# Patient Record
Sex: MALE | Race: WHITE | NOT HISPANIC OR LATINO | Employment: OTHER | URBAN - METROPOLITAN AREA
[De-identification: names, ages, dates, MRNs, and addresses within clinical notes are randomized per-mention and may not be internally consistent; named-entity substitution may affect disease eponyms.]

---

## 2018-01-16 ENCOUNTER — TELEPHONE (OUTPATIENT)
Dept: SURGERY | Facility: CLINIC | Age: 73
End: 2018-01-16

## 2018-01-16 NOTE — TELEPHONE ENCOUNTER
----- Message from Jero Bonilla sent at 1/15/2018  4:06 PM CST -----  Patient states that he believes he has a sports hernia and would like to speak with a nurse before scheduling an appt//please call back at [phone]//thank you

## 2018-01-30 ENCOUNTER — OFFICE VISIT (OUTPATIENT)
Dept: SURGERY | Facility: CLINIC | Age: 73
End: 2018-01-30
Payer: MEDICARE

## 2018-01-30 VITALS — HEIGHT: 68 IN | TEMPERATURE: 98 F | BODY MASS INDEX: 26.07 KG/M2 | WEIGHT: 172 LBS

## 2018-01-30 DIAGNOSIS — K40.21 BILATERAL RECURRENT INGUINAL HERNIA WITHOUT OBSTRUCTION OR GANGRENE: ICD-10-CM

## 2018-01-30 PROCEDURE — 1159F MED LIST DOCD IN RCRD: CPT | Mod: S$GLB,,, | Performed by: SURGERY

## 2018-01-30 PROCEDURE — 99203 OFFICE O/P NEW LOW 30 MIN: CPT | Mod: S$GLB,,, | Performed by: SURGERY

## 2018-01-30 PROCEDURE — 3008F BODY MASS INDEX DOCD: CPT | Mod: S$GLB,,, | Performed by: SURGERY

## 2018-01-30 PROCEDURE — 99999 PR PBB SHADOW E&M-EST. PATIENT-LVL II: CPT | Mod: PBBFAC,,, | Performed by: SURGERY

## 2018-01-30 RX ORDER — VALSARTAN 80 MG/1
80 TABLET ORAL 2 TIMES DAILY
Status: ON HOLD | COMMUNITY
Start: 2017-11-27 | End: 2018-03-09 | Stop reason: SDUPTHER

## 2018-01-30 RX ORDER — FENOFIBRATE 160 MG/1
160 TABLET ORAL
COMMUNITY
Start: 2018-01-14

## 2018-01-30 NOTE — LETTER
[facility] - General Surgery  [address]  [facility] [address]  Phone: [phone] January 30, 2018      José Miguel Menard MD  [address] [place] [address] [place]  [facility] [address]    Patient: Emiliano Small   MR Number: [id number]   YOB: 1945   Date of Visit: 1/30/2018     Dear Dr. Menard:    Thank you for referring Emiliano Small to me for evaluation. Below are the relevant portions of my assessment and plan of care.    Patient presents with small BIH, symptomatic on the left.     PLAN: He would like to put off surgery.  Return for recheck in one year.    If you have questions, please do not hesitate to call me. I look forward to following Emiliano along with you.    Sincerely,      Juan M Rader MD   Section Head - General, Laparoscopic, Bariatric  Acute Care and Oncologic Surgery   - Surgical Weight Loss Program  [facility]    MIN[facility]/lida

## 2018-01-30 NOTE — PROGRESS NOTES
History & Physical    SUBJECTIVE:     History of Present Illness:  Patient is a 72 y.o. male presents with right groin pain.  4 months ago he threw a javalin and developed pain.  Since then the pain has improved but not resolved.  It is worse in the morning and when he bends over.  The pain is in the rlq and groin and occasionally radiates to the anterior leg.  It is inhibiting his activity.      Chief Complaint   Patient presents with    Hernia     ventral / sports hernia       Review of patient's allergies indicates:  No Known Allergies    Current Outpatient Prescriptions   Medication Sig Dispense Refill    fenofibrate 160 MG Tab       valsartan (DIOVAN) 160 MG tablet Take 160 mg by mouth once daily.      valsartan (DIOVAN) 80 MG tablet        No current facility-administered medications for this visit.        Past Medical History:   Diagnosis Date    Atrial fibrillation     Cancer     melanoma x 2   & basal cell    CKD (chronic kidney disease), stage III     H/O: gout     History of cardioversion 11/2013    no reoccurrence of af    Hypertension      Past Surgical History:   Procedure Laterality Date    ablation procedure for afib      ELBOW BURSA SURGERY Left 1998    achilles tendon    EYE SURGERY Bilateral 1991    kertodomy &  torn retina (left)    FRACTURE SURGERY Left 1984    5th finger    KNEE ARTHROSCOPY Left 2004,  2003    knee arthroscopy Right     TONSILLECTOMY      vascectomy       History reviewed. No pertinent family history.  Social History   Substance Use Topics    Smoking status: Former Smoker     Quit date: 12/12/1984    Smokeless tobacco: Not on file    Alcohol use Yes      Comment: 3-4 weekly        Review of Systems:  Review of Systems   Constitutional: Negative for fever and unexpected weight change.   Respiratory: Positive for shortness of breath. Negative for cough and chest tightness.         Sob possibly due to onset of the flu   Cardiovascular: Negative for chest pain.  "  Gastrointestinal: Negative for constipation, diarrhea, nausea and vomiting.        Recently got over constipation   Genitourinary: Negative for difficulty urinating and dysuria.   Skin: Positive for rash. Negative for wound.        Has low grade eczema    Neurological: Negative for seizures and headaches.   Hematological: Does not bruise/bleed easily.       OBJECTIVE:     Vital Signs (Most Recent)  Temp: 98.3 °F (36.8 °C) (01/30/18 1002)  5' 8" (1.727 m)  78 kg (172 lb)     Physical Exam:  Physical Exam   Constitutional: He is oriented to person, place, and time. He appears well-developed and well-nourished.   Neck: Normal range of motion. Neck supple.   Cardiovascular: Normal rate, regular rhythm and normal heart sounds.    Pulmonary/Chest: Effort normal and breath sounds normal.   Abdominal: Soft. Bowel sounds are normal. There is no tenderness. A hernia is present. Hernia confirmed positive in the right inguinal area and confirmed positive in the left inguinal area.   Genitourinary: Testes normal.   Genitourinary Comments: Small, reducible bilateral inguinal hernias   Neurological: He is alert and oriented to person, place, and time.   Skin: Skin is warm and dry.   Psychiatric: He has a normal mood and affect. His behavior is normal. Judgment and thought content normal.   Vitals reviewed.      Laboratory  None recent    Diagnostic Results:  None recent    ASSESSMENT/PLAN:     Small bih, symptomatic on the left.    PLAN:    He would like to put off surgery.  Return for recheck in one year.              "

## 2018-01-30 NOTE — LETTER
January 30, 2018      José Miguel Menard MD  [address] [place] [address]           [facility] - General Surgery  [address]  [place] [address]  Phone: [phone]          Patient: Emiliano Small   MR Number: [id number]   YOB: 1945   Date of Visit: 1/30/2018       Dear Dr. José Miguel Menard:    Thank you for referring Emiliano Small to me for evaluation. Attached you will find relevant portions of my assessment and plan of care.    If you have questions, please do not hesitate to call me. I look forward to following Emiliano Small along with you.    Sincerely,    Juan M Rader MD    Enclosure  CC:  No Recipients    If you would like to receive this communication electronically, please contact [email or web address] or [phone] to request more information on [employer] access.    For providers and/or their staff who would like to refer a patient to [facility], please contact us through our one-stop-shop provider referral line, [facility], at [phone].    If you feel you have received this communication in error or would no longer like to receive these types of communications, please e-mail [email or web address]

## 2018-02-16 ENCOUNTER — TELEPHONE (OUTPATIENT)
Dept: SURGERY | Facility: CLINIC | Age: 73
End: 2018-02-16

## 2018-02-16 NOTE — TELEPHONE ENCOUNTER
Returned pt's call.  Informed him that Radha will call him Monday morning to select a surgery date.  Pt verbalized understanding.    ----- Message from Jero Bonilla sent at 2/16/2018  2:54 PM CST -----  Patient states that (s)he needs to speak with nurse in ref to scheduling his sx//please call back at [phone]//thank you

## 2018-03-01 ENCOUNTER — TELEPHONE (OUTPATIENT)
Dept: SURGERY | Facility: CLINIC | Age: 73
End: 2018-03-01

## 2018-03-01 NOTE — TELEPHONE ENCOUNTER
----- Message from Judy Loza sent at 3/1/2018  2:28 PM CST -----  Contact: Pt.Self   Pt.States he Would like to speak to nurse in reference to his Appt.Please call Pt. back @ [phone] Thanks :)

## 2018-03-06 ENCOUNTER — LAB VISIT (OUTPATIENT)
Dept: LAB | Facility: HOSPITAL | Age: 73
End: 2018-03-06
Payer: MEDICARE

## 2018-03-06 ENCOUNTER — OFFICE VISIT (OUTPATIENT)
Dept: SURGERY | Facility: CLINIC | Age: 73
End: 2018-03-06
Payer: MEDICARE

## 2018-03-06 VITALS
HEART RATE: 77 BPM | DIASTOLIC BLOOD PRESSURE: 83 MMHG | WEIGHT: 172 LBS | BODY MASS INDEX: 26.07 KG/M2 | TEMPERATURE: 98 F | HEIGHT: 68 IN | SYSTOLIC BLOOD PRESSURE: 163 MMHG

## 2018-03-06 DIAGNOSIS — K40.21 BILATERAL RECURRENT INGUINAL HERNIA WITHOUT OBSTRUCTION OR GANGRENE: ICD-10-CM

## 2018-03-06 DIAGNOSIS — K40.21 BILATERAL RECURRENT INGUINAL HERNIA WITHOUT OBSTRUCTION OR GANGRENE: Primary | ICD-10-CM

## 2018-03-06 LAB
ANION GAP SERPL CALC-SCNC: 7 MMOL/L
BASOPHILS # BLD AUTO: 0.04 K/UL
BASOPHILS NFR BLD: 0.6 %
BUN SERPL-MCNC: 23 MG/DL
CALCIUM SERPL-MCNC: 9.5 MG/DL
CHLORIDE SERPL-SCNC: 107 MMOL/L
CO2 SERPL-SCNC: 27 MMOL/L
CREAT SERPL-MCNC: 1.2 MG/DL
DIFFERENTIAL METHOD: NORMAL
EOSINOPHIL # BLD AUTO: 0.1 K/UL
EOSINOPHIL NFR BLD: 1.9 %
ERYTHROCYTE [DISTWIDTH] IN BLOOD BY AUTOMATED COUNT: 12.6 %
EST. GFR  (AFRICAN AMERICAN): >60 ML/MIN/1.73 M^2
EST. GFR  (NON AFRICAN AMERICAN): >60 ML/MIN/1.73 M^2
GLUCOSE SERPL-MCNC: 88 MG/DL
HCT VFR BLD AUTO: 44.8 %
HGB BLD-MCNC: 14.7 G/DL
IMM GRANULOCYTES # BLD AUTO: 0.02 K/UL
IMM GRANULOCYTES NFR BLD AUTO: 0.3 %
LYMPHOCYTES # BLD AUTO: 1.6 K/UL
LYMPHOCYTES NFR BLD: 25.5 %
MCH RBC QN AUTO: 29.6 PG
MCHC RBC AUTO-ENTMCNC: 32.8 G/DL
MCV RBC AUTO: 90 FL
MONOCYTES # BLD AUTO: 0.6 K/UL
MONOCYTES NFR BLD: 10 %
NEUTROPHILS # BLD AUTO: 3.9 K/UL
NEUTROPHILS NFR BLD: 61.7 %
NRBC BLD-RTO: 0 /100 WBC
PLATELET # BLD AUTO: 248 K/UL
PMV BLD AUTO: 10.9 FL
POTASSIUM SERPL-SCNC: 4.3 MMOL/L
RBC # BLD AUTO: 4.97 M/UL
SODIUM SERPL-SCNC: 141 MMOL/L
WBC # BLD AUTO: 6.32 K/UL

## 2018-03-06 PROCEDURE — 85025 COMPLETE CBC W/AUTO DIFF WBC: CPT

## 2018-03-06 PROCEDURE — 99213 OFFICE O/P EST LOW 20 MIN: CPT | Mod: S$GLB,,, | Performed by: SURGERY

## 2018-03-06 PROCEDURE — 36415 COLL VENOUS BLD VENIPUNCTURE: CPT

## 2018-03-06 PROCEDURE — 99999 PR PBB SHADOW E&M-EST. PATIENT-LVL III: CPT | Mod: PBBFAC,,, | Performed by: SURGERY

## 2018-03-06 PROCEDURE — 80048 BASIC METABOLIC PNL TOTAL CA: CPT

## 2018-03-06 NOTE — LETTER
[facility] - General Surgery  [address]  [facility] [address]  Phone: [phone] March 6, 2018      José Miguel Menard MD  [address] [place] [address]    Patient: Emiliano Small   MR Number: [id number]   YOB: 1945   Date of Visit: 3/6/2018     Dear Dr. Menard:    Thank you for referring Emiliano Small to me for evaluation. Below are the relevant portions of my assessment and plan of care.    ASSESSMENT/PLAN:   Patient is a 72-year-old active male with small bilateral inguinal hernia (symptomatic on right).     - Plan for inguinal hernia repair with mesh  - Risks, benefits, alternatives to the procedure discussed with the patient who wishes to proceed  - All questions and concerns addressed  - Consents obtained today    If you have questions, please do not hesitate to call me. I look forward to following Emiliano along with you.    Sincerely,      Juan M Rader MD   Section Head - General, Laparoscopic, Bariatric  Acute Care and Oncologic Surgery   - Surgical Weight Loss Program  [facility]    [facility]ida

## 2018-03-06 NOTE — PROGRESS NOTES
History & Physical    SUBJECTIVE:     History of Present Illness:  Patient is a 72 y.o. male who presents to clinic today for follow up. He was previously seen in our clinic (1/30/18) for complaint of right groin pain. Pain originally started in Sept 2017 after throwing javelin. Pain improved in the interim but remains lifestyle limiting. He is very active and works out with weight and track and field exercised frequently. He is unable to perform these activities much without provoking pain. He initially wanted to defer surgery but now does would like to proceed so he can return to his accustomed activities.      Chief Complaint   Patient presents with    Follow-up     discuss sx// choose date     Review of patient's allergies indicates:  No Known Allergies    Current Outpatient Prescriptions   Medication Sig Dispense Refill    fenofibrate 160 MG Tab       valsartan (DIOVAN) 160 MG tablet Take 160 mg by mouth once daily.      valsartan (DIOVAN) 80 MG tablet        No current facility-administered medications for this visit.      Past Medical History:   Diagnosis Date    Atrial fibrillation     Cancer     melanoma x 2   & basal cell    CKD (chronic kidney disease), stage III     H/O: gout     History of cardioversion 11/2013    no reoccurrence of af    Hypertension      Past Surgical History:   Procedure Laterality Date    ablation procedure for afib      ELBOW BURSA SURGERY Left 1998    achilles tendon    EYE SURGERY Bilateral 1991    kertodomy &  torn retina (left)    FRACTURE SURGERY Left 1984    5th finger    KNEE ARTHROSCOPY Left 2004,  2003    knee arthroscopy Right     TONSILLECTOMY      vascectomy       No family history on file.  Social History   Substance Use Topics    Smoking status: Former Smoker     Quit date: 12/12/1984    Smokeless tobacco: Not on file    Alcohol use Yes      Comment: 3-4 weekly        Review of Systems:  Review of Systems   Constitutional: Negative for activity  "change, appetite change, diaphoresis, fever and unexpected weight change.   HENT: Negative for congestion, rhinorrhea and sore throat.    Eyes: Negative for visual disturbance.   Respiratory: Negative for cough, chest tightness, shortness of breath and wheezing.    Cardiovascular: Negative for chest pain, palpitations and leg swelling.   Gastrointestinal: Negative for abdominal distention, abdominal pain, constipation, diarrhea, nausea and vomiting.   Genitourinary: Negative for difficulty urinating, dysuria, frequency and hematuria.   Musculoskeletal: Negative for arthralgias and myalgias.   Skin: Positive for rash. Negative for color change and wound.        Has low grade eczema    Neurological: Negative for seizures, syncope, weakness, numbness and headaches.   Hematological: Does not bruise/bleed easily.   Psychiatric/Behavioral: Negative for behavioral problems and confusion.       OBJECTIVE:     Vital Signs (Most Recent)  Temp: 98.1 °F (36.7 °C) (03/06/18 0802)  Pulse: 77 (03/06/18 0802)  BP: (!) 163/83 (03/06/18 0802)  5' 8" (1.727 m)  78 kg (172 lb)     Physical Exam:  Physical Exam   Constitutional: He is oriented to person, place, and time. He appears well-developed and well-nourished. No distress.   HENT:   Head: Normocephalic and atraumatic.   Eyes: EOM are normal.   Neck: Normal range of motion. Neck supple. No JVD present.   Cardiovascular: Normal rate, regular rhythm and intact distal pulses.    Pulmonary/Chest: Effort normal and breath sounds normal. No respiratory distress.   Abdominal: Soft. He exhibits no distension. There is no tenderness. A hernia is present. Hernia confirmed positive in the right inguinal area and confirmed positive in the left inguinal area.   Genitourinary: Testes normal.   Genitourinary Comments: Small, reducible bilateral inguinal hernias   Musculoskeletal: He exhibits no edema or deformity.   Neurological: He is alert and oriented to person, place, and time.   Skin: Skin " is warm and dry. No rash noted. He is not diaphoretic. No erythema.   Psychiatric: He has a normal mood and affect. His behavior is normal. Judgment and thought content normal.   Vitals reviewed.        ASSESSMENT/PLAN:   71 y/o active male with small bilateral inguinal hernia (symptomatic on right)    - Plan for inguinal hernia repair with mesh  - Risks, benefits, alternatives to the procedure discussed with the patient who wishes to proceed  - All questions and concerns addressed  - Consents obtained today      Haseeb Pandya MD  Surgery Resident, PGY-III  Pager: [phone]  3/6/2018 8:26 AM

## 2018-03-06 NOTE — PROGRESS NOTES
I have seen the patient, reviewed the Resident's history and physical, assessment and plan. I have personally interviewed and examined the patient at bedside and: agree with the findings.     For lap cosmo with mesh. Cardiology clearance.

## 2018-03-08 ENCOUNTER — TELEPHONE (OUTPATIENT)
Dept: SURGERY | Facility: CLINIC | Age: 73
End: 2018-03-08

## 2018-03-08 RX ORDER — ASPIRIN 81 MG/1
TABLET ORAL EVERY MORNING
COMMUNITY

## 2018-03-08 RX ORDER — AMOXICILLIN 500 MG
1 CAPSULE ORAL EVERY MORNING
COMMUNITY

## 2018-03-08 RX ORDER — MAGNESIUM 200 MG
1 TABLET ORAL EVERY MORNING
COMMUNITY

## 2018-03-09 ENCOUNTER — SURGERY (OUTPATIENT)
Age: 73
End: 2018-03-09

## 2018-03-09 ENCOUNTER — HOSPITAL ENCOUNTER (OUTPATIENT)
Facility: HOSPITAL | Age: 73
Discharge: HOME | End: 2018-03-09
Attending: SURGERY | Admitting: SURGERY
Payer: MEDICARE

## 2018-03-09 ENCOUNTER — ANESTHESIA EVENT (OUTPATIENT)
Dept: SURGERY | Facility: HOSPITAL | Age: 73
End: 2018-03-09
Payer: MEDICARE

## 2018-03-09 ENCOUNTER — ANESTHESIA (OUTPATIENT)
Dept: SURGERY | Facility: HOSPITAL | Age: 73
End: 2018-03-09
Payer: MEDICARE

## 2018-03-09 VITALS
OXYGEN SATURATION: 100 % | RESPIRATION RATE: 18 BRPM | DIASTOLIC BLOOD PRESSURE: 80 MMHG | TEMPERATURE: 98 F | WEIGHT: 168 LBS | HEIGHT: 69 IN | SYSTOLIC BLOOD PRESSURE: 140 MMHG | HEART RATE: 70 BPM | BODY MASS INDEX: 24.88 KG/M2

## 2018-03-09 DIAGNOSIS — K40.20 BILATERAL INGUINAL HERNIA: ICD-10-CM

## 2018-03-09 PROCEDURE — 71000039 HC RECOVERY, EACH ADD'L HOUR: Performed by: SURGERY

## 2018-03-09 PROCEDURE — 63600175 PHARM REV CODE 636 W HCPCS: Performed by: ANESTHESIOLOGY

## 2018-03-09 PROCEDURE — 36000708 HC OR TIME LEV III 1ST 15 MIN: Performed by: SURGERY

## 2018-03-09 PROCEDURE — 27201423 OPTIME MED/SURG SUP & DEVICES STERILE SUPPLY: Performed by: SURGERY

## 2018-03-09 PROCEDURE — 36000709 HC OR TIME LEV III EA ADD 15 MIN: Performed by: SURGERY

## 2018-03-09 PROCEDURE — 25000003 PHARM REV CODE 250: Performed by: SURGERY

## 2018-03-09 PROCEDURE — D9220A PRA ANESTHESIA: Mod: ANES,,, | Performed by: ANESTHESIOLOGY

## 2018-03-09 PROCEDURE — 37000008 HC ANESTHESIA 1ST 15 MINUTES: Performed by: SURGERY

## 2018-03-09 PROCEDURE — 25000003 PHARM REV CODE 250: Performed by: NURSE ANESTHETIST, CERTIFIED REGISTERED

## 2018-03-09 PROCEDURE — 63600175 PHARM REV CODE 636 W HCPCS: Performed by: SURGERY

## 2018-03-09 PROCEDURE — C1781 MESH (IMPLANTABLE): HCPCS | Performed by: SURGERY

## 2018-03-09 PROCEDURE — 27000221 HC OXYGEN, UP TO 24 HOURS

## 2018-03-09 PROCEDURE — D9220A PRA ANESTHESIA: Mod: CRNA,,, | Performed by: NURSE ANESTHETIST, CERTIFIED REGISTERED

## 2018-03-09 PROCEDURE — 71000033 HC RECOVERY, INTIAL HOUR: Performed by: SURGERY

## 2018-03-09 PROCEDURE — 71000015 HC POSTOP RECOV 1ST HR: Performed by: SURGERY

## 2018-03-09 PROCEDURE — 49650 LAP ING HERNIA REPAIR INIT: CPT | Mod: 50,,, | Performed by: SURGERY

## 2018-03-09 PROCEDURE — 37000009 HC ANESTHESIA EA ADD 15 MINS: Performed by: SURGERY

## 2018-03-09 PROCEDURE — 94761 N-INVAS EAR/PLS OXIMETRY MLT: CPT

## 2018-03-09 PROCEDURE — 63600175 PHARM REV CODE 636 W HCPCS: Performed by: NURSE ANESTHETIST, CERTIFIED REGISTERED

## 2018-03-09 DEVICE — MESH PROLENE 6INX6IN.: Type: IMPLANTABLE DEVICE | Site: INGUINAL | Status: FUNCTIONAL

## 2018-03-09 RX ORDER — DEXAMETHASONE SODIUM PHOSPHATE 4 MG/ML
INJECTION, SOLUTION INTRA-ARTICULAR; INTRALESIONAL; INTRAMUSCULAR; INTRAVENOUS; SOFT TISSUE
Status: DISCONTINUED | OUTPATIENT
Start: 2018-03-09 | End: 2018-03-09

## 2018-03-09 RX ORDER — SODIUM CHLORIDE 0.9 % (FLUSH) 0.9 %
3 SYRINGE (ML) INJECTION
Status: DISCONTINUED | OUTPATIENT
Start: 2018-03-09 | End: 2018-03-09 | Stop reason: HOSPADM

## 2018-03-09 RX ORDER — BUPIVACAINE HYDROCHLORIDE 2.5 MG/ML
INJECTION, SOLUTION EPIDURAL; INFILTRATION; INTRACAUDAL
Status: DISCONTINUED | OUTPATIENT
Start: 2018-03-09 | End: 2018-03-09 | Stop reason: HOSPADM

## 2018-03-09 RX ORDER — CEFAZOLIN SODIUM 1 G/3ML
2 INJECTION, POWDER, FOR SOLUTION INTRAMUSCULAR; INTRAVENOUS
Status: DISCONTINUED | OUTPATIENT
Start: 2018-03-09 | End: 2018-03-09 | Stop reason: HOSPADM

## 2018-03-09 RX ORDER — LIDOCAINE HCL/PF 100 MG/5ML
SYRINGE (ML) INTRAVENOUS
Status: DISCONTINUED | OUTPATIENT
Start: 2018-03-09 | End: 2018-03-09

## 2018-03-09 RX ORDER — POLYETHYLENE GLYCOL 3350 17 G/17G
17 POWDER, FOR SOLUTION ORAL DAILY
Qty: 510 G | Refills: 3 | Status: SHIPPED | OUTPATIENT
Start: 2018-03-09

## 2018-03-09 RX ORDER — ONDANSETRON 8 MG/1
8 TABLET, ORALLY DISINTEGRATING ORAL EVERY 8 HOURS PRN
Qty: 30 TABLET | Refills: 1 | Status: SHIPPED | OUTPATIENT
Start: 2018-03-09

## 2018-03-09 RX ORDER — ACETAMINOPHEN 10 MG/ML
INJECTION, SOLUTION INTRAVENOUS
Status: DISCONTINUED | OUTPATIENT
Start: 2018-03-09 | End: 2018-03-09

## 2018-03-09 RX ORDER — FENTANYL CITRATE 50 UG/ML
INJECTION, SOLUTION INTRAMUSCULAR; INTRAVENOUS
Status: DISCONTINUED | OUTPATIENT
Start: 2018-03-09 | End: 2018-03-09

## 2018-03-09 RX ORDER — FENTANYL CITRATE 50 UG/ML
50 INJECTION, SOLUTION INTRAMUSCULAR; INTRAVENOUS EVERY 5 MIN PRN
Status: DISCONTINUED | OUTPATIENT
Start: 2018-03-09 | End: 2018-03-09 | Stop reason: HOSPADM

## 2018-03-09 RX ORDER — HYDROCODONE BITARTRATE AND ACETAMINOPHEN 10; 325 MG/1; MG/1
1 TABLET ORAL EVERY 4 HOURS PRN
Status: DISCONTINUED | OUTPATIENT
Start: 2018-03-09 | End: 2018-03-09 | Stop reason: HOSPADM

## 2018-03-09 RX ORDER — BACITRACIN 50000 [IU]/1
INJECTION, POWDER, FOR SOLUTION INTRAMUSCULAR
Status: DISCONTINUED | OUTPATIENT
Start: 2018-03-09 | End: 2018-03-09 | Stop reason: HOSPADM

## 2018-03-09 RX ORDER — MIDAZOLAM HYDROCHLORIDE 1 MG/ML
INJECTION, SOLUTION INTRAMUSCULAR; INTRAVENOUS
Status: DISCONTINUED | OUTPATIENT
Start: 2018-03-09 | End: 2018-03-09

## 2018-03-09 RX ORDER — SODIUM CHLORIDE 9 MG/ML
INJECTION, SOLUTION INTRAVENOUS CONTINUOUS
Status: DISCONTINUED | OUTPATIENT
Start: 2018-03-09 | End: 2018-03-09 | Stop reason: HOSPADM

## 2018-03-09 RX ORDER — GLYCOPYRROLATE 0.2 MG/ML
INJECTION INTRAMUSCULAR; INTRAVENOUS
Status: DISCONTINUED | OUTPATIENT
Start: 2018-03-09 | End: 2018-03-09

## 2018-03-09 RX ORDER — ONDANSETRON 2 MG/ML
INJECTION INTRAMUSCULAR; INTRAVENOUS
Status: DISCONTINUED | OUTPATIENT
Start: 2018-03-09 | End: 2018-03-09

## 2018-03-09 RX ORDER — MEPERIDINE HYDROCHLORIDE 50 MG/ML
12.5 INJECTION INTRAMUSCULAR; INTRAVENOUS; SUBCUTANEOUS EVERY 10 MIN PRN
Status: DISCONTINUED | OUTPATIENT
Start: 2018-03-09 | End: 2018-03-09 | Stop reason: HOSPADM

## 2018-03-09 RX ORDER — HYDROCODONE BITARTRATE AND ACETAMINOPHEN 5; 325 MG/1; MG/1
1 TABLET ORAL EVERY 4 HOURS PRN
Qty: 50 TABLET | Refills: 0 | OUTPATIENT
Start: 2018-03-09 | End: 2022-10-11

## 2018-03-09 RX ORDER — PROPOFOL 10 MG/ML
VIAL (ML) INTRAVENOUS
Status: DISCONTINUED | OUTPATIENT
Start: 2018-03-09 | End: 2018-03-09

## 2018-03-09 RX ORDER — ROCURONIUM BROMIDE 10 MG/ML
INJECTION, SOLUTION INTRAVENOUS
Status: DISCONTINUED | OUTPATIENT
Start: 2018-03-09 | End: 2018-03-09

## 2018-03-09 RX ORDER — HYDROCODONE BITARTRATE AND ACETAMINOPHEN 5; 325 MG/1; MG/1
1 TABLET ORAL EVERY 4 HOURS PRN
Status: DISCONTINUED | OUTPATIENT
Start: 2018-03-09 | End: 2018-03-09 | Stop reason: HOSPADM

## 2018-03-09 RX ORDER — ACETAMINOPHEN 325 MG/1
650 TABLET ORAL EVERY 4 HOURS PRN
Status: DISCONTINUED | OUTPATIENT
Start: 2018-03-09 | End: 2018-03-09 | Stop reason: HOSPADM

## 2018-03-09 RX ORDER — VALSARTAN 80 MG/1
80 TABLET ORAL 2 TIMES DAILY
Qty: 14 TABLET | Refills: 0 | Status: SHIPPED | OUTPATIENT
Start: 2018-03-09

## 2018-03-09 RX ADMIN — MIDAZOLAM HYDROCHLORIDE 2 MG: 1 INJECTION, SOLUTION INTRAMUSCULAR; INTRAVENOUS at 01:03

## 2018-03-09 RX ADMIN — CEFAZOLIN 2 G: 330 INJECTION, POWDER, FOR SOLUTION INTRAMUSCULAR; INTRAVENOUS at 02:03

## 2018-03-09 RX ADMIN — PROPOFOL 150 MG: 10 INJECTION, EMULSION INTRAVENOUS at 01:03

## 2018-03-09 RX ADMIN — SODIUM CHLORIDE: 0.9 INJECTION, SOLUTION INTRAVENOUS at 01:03

## 2018-03-09 RX ADMIN — SODIUM CHLORIDE, SODIUM GLUCONATE, SODIUM ACETATE, POTASSIUM CHLORIDE, MAGNESIUM CHLORIDE, SODIUM PHOSPHATE, DIBASIC, AND POTASSIUM PHOSPHATE: .53; .5; .37; .037; .03; .012; .00082 INJECTION, SOLUTION INTRAVENOUS at 02:03

## 2018-03-09 RX ADMIN — SUGAMMADEX 300 MG: 100 INJECTION, SOLUTION INTRAVENOUS at 02:03

## 2018-03-09 RX ADMIN — ROCURONIUM BROMIDE 40 MG: 10 INJECTION, SOLUTION INTRAVENOUS at 01:03

## 2018-03-09 RX ADMIN — ONDANSETRON 4 MG: 2 INJECTION INTRAMUSCULAR; INTRAVENOUS at 01:03

## 2018-03-09 RX ADMIN — DEXAMETHASONE SODIUM PHOSPHATE 4 MG: 4 INJECTION, SOLUTION INTRAMUSCULAR; INTRAVENOUS at 01:03

## 2018-03-09 RX ADMIN — BUPIVACAINE HYDROCHLORIDE 22 ML: 2.5 INJECTION, SOLUTION EPIDURAL; INFILTRATION; INTRACAUDAL; PERINEURAL at 02:03

## 2018-03-09 RX ADMIN — EPHEDRINE SULFATE 10 MG: 50 INJECTION, SOLUTION INTRAMUSCULAR; INTRAVENOUS; SUBCUTANEOUS at 02:03

## 2018-03-09 RX ADMIN — FENTANYL CITRATE 100 MCG: 50 INJECTION, SOLUTION INTRAMUSCULAR; INTRAVENOUS at 01:03

## 2018-03-09 RX ADMIN — ROCURONIUM BROMIDE 10 MG: 10 INJECTION, SOLUTION INTRAVENOUS at 02:03

## 2018-03-09 RX ADMIN — ACETAMINOPHEN 1000 MG: 10 INJECTION, SOLUTION INTRAVENOUS at 02:03

## 2018-03-09 RX ADMIN — HYDROCODONE BITARTRATE AND ACETAMINOPHEN 1 TABLET: 10; 325 TABLET ORAL at 03:03

## 2018-03-09 RX ADMIN — BACITRACIN 50000 UNITS: 50000 INJECTION, POWDER, LYOPHILIZED, FOR SOLUTION INTRAMUSCULAR at 02:03

## 2018-03-09 RX ADMIN — LIDOCAINE HYDROCHLORIDE 75 MG: 20 INJECTION, SOLUTION INTRAVENOUS at 01:03

## 2018-03-09 RX ADMIN — GLYCOPYRROLATE 0.2 MG: 0.2 INJECTION, SOLUTION INTRAMUSCULAR; INTRAVENOUS at 02:03

## 2018-03-09 RX ADMIN — FENTANYL CITRATE 50 MCG: 50 INJECTION, SOLUTION INTRAMUSCULAR; INTRAVENOUS at 03:03

## 2018-03-09 RX ADMIN — FENTANYL CITRATE 50 MCG: 50 INJECTION, SOLUTION INTRAMUSCULAR; INTRAVENOUS at 04:03

## 2018-03-09 NOTE — PLAN OF CARE
PT is AAOx4. VSS. NAD. IV discontinued. Discharge instructions given. Verbalized understanding. Voided without difficulty. Discharged home with family.

## 2018-03-09 NOTE — PRE-PROCEDURE INSTRUCTIONS
Spoke with Patient.  NPO, medication, and pre-op instructions reviewed.  Has a history of post-op urinary retention.  Is currently at a Drug store trying to get a few days of Diovan.  He is currently out of and is waiting for his mail order.  Has a ring that he may not be able to remove.  Reviewed the pain scale.  Aspirin is on Hold.  Verbalized understanding of instructions.

## 2018-03-09 NOTE — BRIEF OP NOTE
[facility]  Brief Operative Note     SUMMARY     Surgery Date: 3/9/2018     Surgeon(s) and Role:     * Juan M Rader MD - Primary     * Isabella Pleitez MD - Resident - Assisting        Pre-op Diagnosis:  Bilateral recurrent inguinal hernia without obstruction or gangrene [K40.21]    Post-op Diagnosis:  Post-Op Diagnosis Codes:     * Bilateral recurrent inguinal hernia without obstruction or gangrene [K40.21]    Procedure(s) (LRB):  REPAIR-HERNIA-INGUINAL-BILATERAL-LAPAROSCOPIC (Bilateral)    Anesthesia: General    Description/Findings of the procedure:  No complications, good hemostasis.    Estimated Blood Loss:  2 mL.       Specimens:   Specimen (12h ago through future)    None          Discharge Note    SUMMARY     Admit Date: 3/9/2018    Discharge Date and Time:  03/09/2018 2:55 PM    Hospital Course (synopsis of major diagnoses, care, treatment, and services provided during the course of the hospital stay): No complications, patient discharged home after routine outpatient surgery.        Final Diagnosis: Post-Op Diagnosis Codes:     * Bilateral recurrent inguinal hernia without obstruction or gangrene [K40.21]    Disposition: Home or Self Care    Follow Up/Patient Instructions:     Medications:  Reconciled Home Medications:   Current Discharge Medication List      START taking these medications    Details   hydrocodone-acetaminophen 5-325mg (NORCO) 5-325 mg per tablet Take 1 tablet by mouth every 4 (four) hours as needed for Pain.  Qty: 50 tablet, Refills: 0      ondansetron (ZOFRAN-ODT) 8 MG TbDL Take 1 tablet (8 mg total) by mouth every 8 (eight) hours as needed (nausea).  Qty: 30 tablet, Refills: 1      polyethylene glycol (GLYCOLAX) 17 gram/dose powder Take 17 g by mouth once daily.  Qty: 510 g, Refills: 3         CONTINUE these medications which have NOT CHANGED    Details   aspirin (ECOTRIN) 81 MG EC tablet Take by mouth every morning. Takes two 81 mg tablets (162 mg) on  Mondays, Wednesdays, and Fridays,  Take one 81 mg tablet the other days of the week.      ERGOCALCIFEROL, VITAMIN D2, (VITAMIN D ORAL) Take 1 tablet by mouth. Takes twice a week      fenofibrate 160 MG Tab Take 160 mg by mouth after dinner.       fish oil-omega-3 fatty acids 300-1,000 mg capsule Take 1 capsule by mouth every morning.      magnesium 200 mg Tab Take 1 tablet by mouth every morning.      UNABLE TO FIND Take 1 tablet by mouth 3 (three) times daily. Take Lipoflavanoid 2-3 times per pain for Tinnitus      valsartan (DIOVAN) 80 MG tablet Take 1 tablet (80 mg total) by mouth 2 (two) times daily.  Qty: 14 tablet, Refills: 0             Discharge Procedure Orders  Diet general     Activity as tolerated     Shower on day dressing removed (No bath)     Ice to affected area     Lifting restrictions     Call MD for:  temperature >100.4     Call MD for:  persistent nausea and vomiting     Call MD for:  severe uncontrolled pain     Call MD for:  difficulty breathing, headache or visual disturbances     Call MD for:  redness, tenderness, or signs of infection (pain, swelling, redness, odor or green/yellow discharge around incision site)     Call MD for:  hives     Call MD for:  persistent dizziness or light-headedness     Remove dressing in 48 hours   Order Comments: Leave steri-strips in place for 2 weeks (unless they fall off sooner).       Follow-up Information     Juan M Rader MD In 2 weeks.    Specialties:  General Surgery, Bariatrics  Why:  Post-op appointment in 2 weeks  Contact information:  [address]  [facility] [address]  [phone]

## 2018-03-09 NOTE — H&P (VIEW-ONLY)
History & Physical    SUBJECTIVE:     History of Present Illness:  Patient is a 72 y.o. male who presents to clinic today for follow up. He was previously seen in our clinic (1/30/18) for complaint of right groin pain. Pain originally started in Sept 2017 after throwing javelin. Pain improved in the interim but remains lifestyle limiting. He is very active and works out with weight and track and field exercised frequently. He is unable to perform these activities much without provoking pain. He initially wanted to defer surgery but now does would like to proceed so he can return to his accustomed activities.      Chief Complaint   Patient presents with    Follow-up     discuss sx// choose date     Review of patient's allergies indicates:  No Known Allergies    Current Outpatient Prescriptions   Medication Sig Dispense Refill    fenofibrate 160 MG Tab       valsartan (DIOVAN) 160 MG tablet Take 160 mg by mouth once daily.      valsartan (DIOVAN) 80 MG tablet        No current facility-administered medications for this visit.      Past Medical History:   Diagnosis Date    Atrial fibrillation     Cancer     melanoma x 2   & basal cell    CKD (chronic kidney disease), stage III     H/O: gout     History of cardioversion 11/2013    no reoccurrence of af    Hypertension      Past Surgical History:   Procedure Laterality Date    ablation procedure for afib      ELBOW BURSA SURGERY Left 1998    achilles tendon    EYE SURGERY Bilateral 1991    kertodomy &  torn retina (left)    FRACTURE SURGERY Left 1984    5th finger    KNEE ARTHROSCOPY Left 2004,  2003    knee arthroscopy Right     TONSILLECTOMY      vascectomy       No family history on file.  Social History   Substance Use Topics    Smoking status: Former Smoker     Quit date: 12/12/1984    Smokeless tobacco: Not on file    Alcohol use Yes      Comment: 3-4 weekly        Review of Systems:  Review of Systems   Constitutional: Negative for activity  "change, appetite change, diaphoresis, fever and unexpected weight change.   HENT: Negative for congestion, rhinorrhea and sore throat.    Eyes: Negative for visual disturbance.   Respiratory: Negative for cough, chest tightness, shortness of breath and wheezing.    Cardiovascular: Negative for chest pain, palpitations and leg swelling.   Gastrointestinal: Negative for abdominal distention, abdominal pain, constipation, diarrhea, nausea and vomiting.   Genitourinary: Negative for difficulty urinating, dysuria, frequency and hematuria.   Musculoskeletal: Negative for arthralgias and myalgias.   Skin: Positive for rash. Negative for color change and wound.        Has low grade eczema    Neurological: Negative for seizures, syncope, weakness, numbness and headaches.   Hematological: Does not bruise/bleed easily.   Psychiatric/Behavioral: Negative for behavioral problems and confusion.       OBJECTIVE:     Vital Signs (Most Recent)  Temp: 98.1 °F (36.7 °C) (03/06/18 0802)  Pulse: 77 (03/06/18 0802)  BP: (!) 163/83 (03/06/18 0802)  5' 8" (1.727 m)  78 kg (172 lb)     Physical Exam:  Physical Exam   Constitutional: He is oriented to person, place, and time. He appears well-developed and well-nourished. No distress.   HENT:   Head: Normocephalic and atraumatic.   Eyes: EOM are normal.   Neck: Normal range of motion. Neck supple. No JVD present.   Cardiovascular: Normal rate, regular rhythm and intact distal pulses.    Pulmonary/Chest: Effort normal and breath sounds normal. No respiratory distress.   Abdominal: Soft. He exhibits no distension. There is no tenderness. A hernia is present. Hernia confirmed positive in the right inguinal area and confirmed positive in the left inguinal area.   Genitourinary: Testes normal.   Genitourinary Comments: Small, reducible bilateral inguinal hernias   Musculoskeletal: He exhibits no edema or deformity.   Neurological: He is alert and oriented to person, place, and time.   Skin: Skin " is warm and dry. No rash noted. He is not diaphoretic. No erythema.   Psychiatric: He has a normal mood and affect. His behavior is normal. Judgment and thought content normal.   Vitals reviewed.        ASSESSMENT/PLAN:   73 y/o active male with small bilateral inguinal hernia (symptomatic on right)    - Plan for inguinal hernia repair with mesh  - Risks, benefits, alternatives to the procedure discussed with the patient who wishes to proceed  - All questions and concerns addressed  - Consents obtained today      Haseeb Pandya MD  Surgery Resident, PGY-III  Pager: [phone]  3/6/2018 8:26 AM

## 2018-03-09 NOTE — OP NOTE
DATE OF PROCEDURE:  3/9/2018.    PRE OP DIAGNOSIS:  Bilateral inguinal hernias.    POST OP DIAGNOSIS:  Bilateral inguinal hernias.    PROCEDURE:   Laparoscopic bilateral inguinal hernia repair with mesh.     ATTENDING:  Juan M Rader MD.    ASSISTANT:  Isabella Pleitez MD.    ANESTHESIA:  General.     ESTIMATED BLOOD LOSS:  2 mL.    IV FLUIDS:  1500 mL crystalloid.    SPECIMENS:  None.    COMPLICATIONS:  None.    OPERATIVE PROCEDURE:   Following appropriate informed consent, the patient was taken to the operative room where general anesthesia was induced without complication.  The patient received 2 grams of IV cefazolin for preoperative antibiotic prophylaxis.  The patient was prepped and draped in the standard sterile fashion.  Following a WHO-appropriate time-out, an infraumbilical incision was made with 11-blade for access to the preperitoneal space bluntly dissecting down to the rectus sheath which was sharply incised.  Additional blunt dissection of rectus muscle performed for exposure of the posterior sheath with subsequent placement of the balloon dilator from this point directly toward the pubic symphysis, dilating balloon under direct vision to less than 30 puffs. The balloon was removed from the trocar and pneumopreperitoneum to 8 mm of CO2 gas obtained. Two additional 5 mm trocars were placed in the lower midline under direct vision.  Pubic symphysis and ramus were dissected free on both sides. This was taken to the anterior superior iliac spine on both sides. A small vessel on the left lateral abdominal wall was bipolared with excellent hemostasis. There were bilateral indirect hernias.  These were brought deeply and freely into the preperitoneal space. Two 4 x 6 inch keyhole meshes were fashioned out of Prolene and soaked in antibiotic solution, followed by placement in the preperitoneal space and unfolded around the cord.  Each mesh was tacked to close the keyhole on the pubic tubercle and  ramus along their most anterior borders and to the midline to each other. Left overlay patch was also placed. The preperitoneum was inspected and hemostasis was excellent.  Trocars were removed under direct vision. Prior to removing the last trocar, pneumopreperitoneum was allowed to escape and 20 mL of Marcaine solution instilled via the port. The fascia at the naval was closed with a 0-Vicryl.  Skin incisions were closed with 4-0 plain catgut, and reinforced with Mastisol, Steri-Strips and Band-Aids. The patient tolerated the procedure very well and was brought to Recovery Room in stable condition.  Sponge and needle counts were correct at the end of the case.      Dr. Rader was present and scrubbed for the entire case and there were no complications.

## 2018-03-09 NOTE — OP NOTE
DATE OF PROCEDURE: 3/9/2018    PRE OP DIAGNOSIS: Bilateral recurrent inguinal hernia without obstruction or gangrene [K40.21]    POST OP DIAGNOSIS: Bilateral recurrent inguinal hernia without obstruction or gangrene [K40.21]    PROCEDURE: Procedure(s) (LRB):  REPAIR-HERNIA-INGUINAL-BILATERAL-LAPAROSCOPIC (Bilateral)    Surgeon(s) and Role:     * Juan M Rader MD - Primary     * Isabella Pleitez MD - Resident - Assisting  ANESTHESIA: General.     PROCEDURE IN DETAIL: The patient was placed under general anesthesia. Peña   was placed. The abdomen was prepped and draped in the usual manner. Access to   the preperitoneal space was gained by making an incision in the inferior   umbilicus, bluntly dissecting down to the rectus sheath, sharply incising   this and bluntly dissecting the rectus muscle to the posterior sheath and   placing a balloon dilator from this point to the pubic symphysis and dilating   under direct vision to less than 30 puffs. The balloon was removed, the trocar was left   in its place and pneumopreperitoneum to 8 mm of CO2 gas was obtained. Two 5 mm   trocars were placed in the lower midline under direct vision. Pubic symphysis   and ramus were dissected free on both sides. This was taken to the anterior   superior iliac spine on both sides. There were no direct hernias, bilateral lipomas of cord, left indirect hernias and no femoral hernias.  These were brought deeply and freely into the preperitoneal space. 2 4 x 6 inch keyhole meshes were fashioned out of Prolene, placed in antibiotic solution, placed in the preperitoneal space and unfolded around the cord. They were tacked to close the keyhole on the pubic tubercle and ramus   along their most anterior borders and to the midline to each other. right overlay patch was placed. The preperitoneum was inspected for hemostasis. Trocars were removed under direct vision. Prior to removing the last trocar, pneumopreperitoneum was allowed to  escape and 20 mL of Marcaine solution instilled. The fascia at the naval was closed with 0-Vicryl.  Skin incisions were closed with 4-0 plain catgut, and reinforced with Mastisol, Steri-Strips and Band-Aids. The patient tolerated the procedure well and was brought to Recovery Room in stable condition. Sponge and needle counts were correct at the end of the case.    Blood loss: min Pathology: none Complications: none    Juan M Rader MD

## 2018-03-09 NOTE — TRANSFER OF CARE
"Anesthesia Transfer of Care Note    Patient: Emiliano Small    Procedure(s) Performed: Procedure(s) (LRB):  REPAIR-HERNIA-INGUINAL-BILATERAL-LAPAROSCOPIC (Bilateral)    Patient location: PACU    Anesthesia Type: general    Transport from OR: Transported from OR on 6-10 L/min O2 by face mask with adequate spontaneous ventilation    Post pain: adequate analgesia    Post assessment: tolerated procedure well and no apparent anesthetic complications    Post vital signs: stable    Level of consciousness: awake, alert and oriented    Nausea/Vomiting: no nausea/vomiting    Complications: none    Transfer of care protocol was followed      Last vitals:   Visit Vitals  /73 (BP Location: Right arm, Patient Position: Lying)   Pulse 82   Temp 36.2 °C (97.2 °F) (Temporal)   Resp 18   Ht 5' 8.5" (1.74 m)   Wt 76.2 kg (168 lb)   SpO2 100%   BMI 25.17 kg/m²     "

## 2018-03-09 NOTE — DISCHARGE INSTRUCTIONS
Hernia (Adult)    A hernia can happen when there is a weakness or defect in the wall of the abdomen or groin. Intestines or nearby tissues may move from their usual location and push through the weakness in the wall. This can cause a hernia (bulge) you may see or feel.  Causes and Risk Factors   A hernia may be present at birth. Or it may be caused by the wear and tear of daily living. Certain factors can make a hernia more likely. These can include:  · Heavy lifting  · Straining, whether from lifting, movement, or constipation  · Chronic cough  · Injury to the abdominal wall  · Excess weight  · Pregnancy  · Prior surgery  · Older age  · Family history of hernia  Symptoms  Symptoms of a hernia may come on suddenly. Or they may appear slowly over time. Some common symptoms include:  · Bulge in the groin area, around the navel, or in the scrotum (the bulge may get bigger when you stand and go away when you lie down)  · Pain or pressure around the bulge  · Pain during activities such as lifting, coughing, or sneezing  · A feeling of weakness or pressure in the groin  · Pain or swelling in the scrotum  Types of hernias  There are different types of hernia. The type you have depends on its location:  · Inguinal: This type is in the groin or scrotum. It is more common in men.  · Femoral: This type is in the groin, upper thigh (where the leg bends), or labia. It is more common in women.  · Ventral: This type is in the abdominal wall.  · Umbilical: This type occurs around the navel (belly button).  · Incisional: This type occurs at the site of a previous surgery.  The condition of the hernia can help determine how urgently it needs to be treated.  · Reducible: It goes back in by itself, or it can be pushed back in.  · Irreducible: It cant be pushed back in.  · Incarcerated/Strangulated: The intestine is trapped (incarcerated). If this happens, you wont be able to push the bulge back in. If the incarcerated hernia isnt  treated, it may become strangulated. This means the area loses blood supply and the tissue may die. This requires emergency surgery! Treatment is needed right away!  In most cases, a hernia will not heal on its own. Surgery is usually needed to repair the defect in the abdominal wall or groin. Youll be told more about surgery, if needed.  If your symptoms are not severe, treatment may sometimes be delayed. In such cases, regular follow-up visits with the provider will be needed. Youll be asked to keep track of your symptoms and to watch for signs of more serious problems. You may also be given guidelines similar to the home care instructions below.  Home Care  To help keep a hernia from getting worse, you may be advised to:  · Avoid heavy lifting and straining as directed.  · Take steps to prevent constipation, such as eating more fiber and drinking more water. This may help reduce straining that can occur when having a bowel movement. Reducing straining may help keep your symptoms from getting worse.  · Maintain a healthy weight or lose excess weight. This can help reduce strain on abdominal muscles and tissues.  · Stop smoking. This can help prevent coughing that may also strain abdominal muscles and tissues.  Follow-up care  Follow up with your healthcare provider, or as directed. If imaging tests were done, they will be reviewed a doctor. You will be told the results and any new findings that may affect your care.  When to seek medical advice  Call your healthcare provider right away if any of these occur:  · Hernia hardens, swells, or grows larger  · Hernia can no longer be pushed back in  · Pain moves to the lower right abdomen (just below the waistline), or spreads to the back  Call 911  Call 911 right away if any of these occur:  · Nausea and vomiting  · Severe pain, redness, or tenderness in the area near the hernia  · Pain worsens quickly and doesnt get better  · Inability to have a bowel movement or  pass gas  · Fever of 100.4°F (38°C) or higher  · Trouble breathing  · Fainting  · Rapid heart rate  · Vomiting blood  · Large amounts of blood in stool

## 2018-03-09 NOTE — BRIEF OP NOTE
Operative Note       Surgery Date: 3/9/2018     Surgeon(s) and Role:     * Juan M Rader MD - Primary     * Isabella Pleitez MD - Resident - Assisting    Pre-op Diagnosis:  Bilateral recurrent inguinal hernia without obstruction or gangrene [K40.21]    Post-op Diagnosis:  Bilateral recurrent inguinal hernia without obstruction or gangrene [K40.21]    Procedure(s) (LRB):  REPAIR-HERNIA-INGUINAL-BILATERAL-LAPAROSCOPIC (Bilateral)    Anesthesia: General    Procedure in Detail/Findings:  Bilateral lipomas and left indirect hernia.    Estimated Blood Loss: Minimal           Specimens     None        Implants:   Implant Name Type Inv. Item Serial No.  Lot No. LRB No. Used   MESH PROLENE [id number]. - [id number]  MESH PROLENE [id number].  [employer] [id number] Left 1   MESH PROLENE [id number]. - [id number]   MESH PROLENE [id number].   [employer] [id number] Right 1              Disposition: PACU - hemodynamically stable.           Condition: Good    Attestation:  I was present and scrubbed for the entire procedure.

## 2018-03-09 NOTE — PLAN OF CARE
Pt AAOx4. States pain is currently tolerable; PRN pain medication administered as ordered. Sites remain CDI with ice pack in place. Tolerating sips of water. VSS. Safety maintained. Pt to be discharged home per [facility].

## 2018-03-09 NOTE — ANESTHESIA PREPROCEDURE EVALUATION
03/09/2018  Emiliano Small is a 72 y.o., male.    Anesthesia Evaluation    I have reviewed the Patient Summary Reports.     I have reviewed the Medications.     Review of Systems  Anesthesia Hx:  No problems with previous Anesthesia  History of prior surgery of interest to airway management or planning: Previous anesthesia: General   Social:  Former Smoker, Social Alcohol Use    Hematology/Oncology:  Hematology Normal   Oncology Normal     EENT/Dental:EENT/Dental Normal   Cardiovascular:   Exercise tolerance: good Hypertension, well controlled    Pulmonary:  Pulmonary Normal    Renal/:   Chronic Renal Disease    Musculoskeletal:   Arthritis     Neurological:  Neurology Normal    Endocrine:  Endocrine Normal    Dermatological:  Skin Normal    Psych:  Psychiatric Normal           Physical Exam  General:  Well nourished    Airway/Jaw/Neck:  Airway Findings: Mouth Opening: Normal Tongue: Normal  General Airway Assessment: Adult  Mallampati: II  TM Distance: Normal, at least 6 cm  Jaw/Neck Findings:  Neck ROM: Normal ROM      Dental:  Dental Findings: In tact        Mental Status:  Mental Status Findings:  Cooperative         Anesthesia Plan  Type of Anesthesia, risks & benefits discussed:  Anesthesia Type:  general  Patient's Preference: GA  Intra-op Monitoring Plan: standard ASA monitors  Intra-op Monitoring Plan Comments:   Post Op Pain Control Plan: multimodal analgesia  Post Op Pain Control Plan Comments:   Induction:   IV  Beta Blocker:  Patient is not currently on a Beta-Blocker (No further documentation required).       Informed Consent: Patient understands risks and agrees with Anesthesia plan.  Questions answered. Anesthesia consent signed with patient.  ASA Score: 3     Day of Surgery Review of History & Physical:  There are no significant changes.  H&P update referred to the surgeon.          Ready For Surgery From Anesthesia Perspective.

## 2018-03-15 NOTE — ANESTHESIA POSTPROCEDURE EVALUATION
"Anesthesia Post Evaluation    Patient: Emiliano Small    Procedure(s) Performed: Procedure(s) (LRB):  REPAIR-HERNIA-INGUINAL-BILATERAL-LAPAROSCOPIC (Bilateral)    Final Anesthesia Type: general  Patient location during evaluation: PACU  Patient participation: Yes- Able to Participate  Level of consciousness: awake and alert and oriented  Post-procedure vital signs: reviewed and stable  Pain management: adequate  Airway patency: patent  PONV status at discharge: No PONV  Anesthetic complications: no      Cardiovascular status: stable  Respiratory status: unassisted, spontaneous ventilation and room air  Hydration status: euvolemic  Follow-up not needed.  Comments: Called on phone, [phone]        Visit Vitals  BP (!) 140/80   Pulse 70   Temp 36.6 °C (97.9 °F) (Temporal)   Resp 18   Ht 5' 8.5" (1.74 m)   Wt 76.2 kg (168 lb)   SpO2 100%   BMI 25.17 kg/m²       Pain/Reinaldo Score: No Data Recorded      "

## 2018-03-18 NOTE — PHYSICIAN QUERY
PT Name: Emiliano Small  MR #: [id number]     Physician Query Form - Documentation Clarification      CDS/: Beth Warren               Contact information: [email or web address]    This form is a permanent document in the medical record.     Query Date: March 18, 2018    By submitting this query, we are merely seeking further clarification of documentation. Please utilize your independent clinical judgment when addressing the question(s) below.    The Medical record reflects the following:    Supporting Clinical Findings Location in Medical Record   Bilateral lipomas and left indirect hernia.       Brief Op Note   There were no direct hernias, bilateral lipomas of cord, left indirect hernias and no femoral hernias.    There were bilateral indirect hernias.   Operative Note                                                                            Dear Doctor, In the clinical findings above, it was both documented that a left indirect hernia and bilateral indirect hernias were found and repaired in the Operative Notes within the chart.  Can you further specify:    Provider Use Only      ____ Left Indirect Hernia only  ___xx_ Bilateral Indirect Hernias  ____ Undetermined  ____ Other, please specify ______________________________________    And     ____ Laparoscopic Bilateral Hernia Recurrent Repair  ____ Left Laparoscopic Hernia Recurrent Repair only  ____ Other, please specify ______________________________________                                                                                                                 [  ] Clinically undetermined

## 2018-03-22 ENCOUNTER — OFFICE VISIT (OUTPATIENT)
Dept: SURGERY | Facility: CLINIC | Age: 73
End: 2018-03-22
Payer: MEDICARE

## 2018-03-22 VITALS
SYSTOLIC BLOOD PRESSURE: 162 MMHG | TEMPERATURE: 98 F | WEIGHT: 172.19 LBS | DIASTOLIC BLOOD PRESSURE: 77 MMHG | HEART RATE: 78 BPM | HEIGHT: 69 IN | BODY MASS INDEX: 25.5 KG/M2

## 2018-03-22 DIAGNOSIS — Z09 POSTOP CHECK: Primary | ICD-10-CM

## 2018-03-22 PROBLEM — K40.21 BILATERAL RECURRENT INGUINAL HERNIA WITHOUT OBSTRUCTION OR GANGRENE: Status: RESOLVED | Noted: 2018-01-30 | Resolved: 2018-03-22

## 2018-03-22 PROBLEM — K40.20 BILATERAL INGUINAL HERNIA: Status: RESOLVED | Noted: 2018-03-09 | Resolved: 2018-03-22

## 2018-03-22 PROCEDURE — 99024 POSTOP FOLLOW-UP VISIT: CPT | Mod: S$GLB,,, | Performed by: SURGERY

## 2018-03-22 PROCEDURE — 99999 PR PBB SHADOW E&M-EST. PATIENT-LVL III: CPT | Mod: PBBFAC,,, | Performed by: SURGERY

## 2018-03-22 NOTE — LETTER
[facility] - General Surgery  [address]  [facility] [address]  Phone: [phone] March 22, 2018      José Miguel Menard MD  [address] [place] [id number]    Patient: Emiliano Small   MR Number: [id number]   YOB: 1945   Date of Visit: 3/22/2018     Dear Dr. Menard:    Thank you for referring Emiliano Small to me for evaluation. Below are the relevant portions of my assessment and plan of care.    ASSESSMENT/PLAN:   Patient is a 72-year-old active male with small bilateral inguinal hernia (symptomatic on right) status post laparoscopic bilateral inguinal hernia repair with mesh.    PLAN:   - Doing well post-operatively with no apparent complications  - Continue lifting precautions  - Continue local wound care  - Follow up as needed    If you have questions, please do not hesitate to call me. I look forward to following Emiliano along with you.    Sincerely,      Juan M Rader MD   Section Head - General, Laparoscopic, Bariatric  Acute Care and Oncologic Surgery   - Surgical Weight Loss Program  [facility]    [facility]

## 2018-03-22 NOTE — PROGRESS NOTES
I have seen the patient, reviewed the Resident's history and physical, assessment and plan. I have personally interviewed and examined the patient at bedside and: agree with the findings.     S/p lap bih with mesh 3/9/18.  He only has some discomfort.  Wounds clear, no hernias but there is some resolving swelling in the right groin.  Continue light duty until April 20 and rtc prn.

## 2018-03-22 NOTE — PROGRESS NOTES
History & Physical    SUBJECTIVE:     Chief Complaint: Post-operative follow up    History of Present Illness:  Patient is a 72 y.o. male with Hx bilateral inguina hernia s/p laparoscopic bilateral inguinal hernia repair with mesh (3/9/18) who presents to clinic today for follow up. He has done well since surgery. He reports some discomfort that persists but says he would not describe it as pain at this point. Did have some bruising of bilateral scrotum that he states has significantly improved. No fever. No issues with incision. No redness or drainage.    Review of patient's allergies indicates:  No Known Allergies    Current Outpatient Prescriptions   Medication Sig Dispense Refill    aspirin (ECOTRIN) 81 MG EC tablet Take by mouth every morning. Takes two 81 mg tablets (162 mg) on Mondays, Wednesdays, and Fridays,  Take one 81 mg tablet the other days of the week.      ERGOCALCIFEROL, VITAMIN D2, (VITAMIN D ORAL) Take 1 tablet by mouth. Takes twice a week      fenofibrate 160 MG Tab Take 160 mg by mouth after dinner.       fish oil-omega-3 fatty acids 300-1,000 mg capsule Take 1 capsule by mouth every morning.      hydrocodone-acetaminophen 5-325mg (NORCO) 5-325 mg per tablet Take 1 tablet by mouth every 4 (four) hours as needed for Pain. 50 tablet 0    magnesium 200 mg Tab Take 1 tablet by mouth every morning.      ondansetron (ZOFRAN-ODT) 8 MG TbDL Take 1 tablet (8 mg total) by mouth every 8 (eight) hours as needed (nausea). 30 tablet 1    polyethylene glycol (GLYCOLAX) 17 gram/dose powder Take 17 g by mouth once daily. 510 g 3    UNABLE TO FIND Take 1 tablet by mouth 3 (three) times daily. Take Lipoflavanoid 2-3 times per pain for Tinnitus      valsartan (DIOVAN) 80 MG tablet Take 1 tablet (80 mg total) by mouth 2 (two) times daily. 14 tablet 0     No current facility-administered medications for this visit.      Past Medical History:   Diagnosis Date    Arthritis     OA    Atrial fibrillation      Cancer     melanoma x 2   & basal cell    CKD (chronic kidney disease), stage III     H/O: gout     History of cardioversion 11/2013    no reoccurrence of af    Hypertension     Inguinal hernia recurrent bilateral     Tinnitus      Past Surgical History:   Procedure Laterality Date    ablation procedure for afib      ELBOW BURSA SURGERY Left 1998    achilles tendon    EYE SURGERY Bilateral 1991    kertodomy &  torn retina (left)    FRACTURE SURGERY Left 1984    5th finger    INGUINAL HERNIA REPAIR Bilateral 03/09/2018    Laparoscopic    KNEE ARTHROSCOPY Left 2004,  2003    knee arthroscopy Right     TONSILLECTOMY      vascectomy       No family history on file.  Social History   Substance Use Topics    Smoking status: Former Smoker     Types: Cigars     Quit date: 1982    Smokeless tobacco: Never Used    Alcohol use 1.8 - 2.4 oz/week     3 - 4 Cans of beer per week        Review of Systems:  Review of Systems   Constitutional: Negative for activity change, appetite change, diaphoresis, fever and unexpected weight change.   HENT: Negative for congestion, rhinorrhea and sore throat.    Eyes: Negative for visual disturbance.   Respiratory: Negative for cough, chest tightness, shortness of breath and wheezing.    Cardiovascular: Negative for chest pain, palpitations and leg swelling.   Gastrointestinal: Negative for abdominal distention, abdominal pain, constipation, diarrhea, nausea and vomiting.   Genitourinary: Negative for difficulty urinating, dysuria, frequency and hematuria.   Musculoskeletal: Negative for arthralgias and myalgias.   Skin: Positive for rash. Negative for color change and wound.        Has low grade eczema    Neurological: Negative for seizures, syncope, weakness, numbness and headaches.   Hematological: Does not bruise/bleed easily.   Psychiatric/Behavioral: Negative for behavioral problems and confusion.       OBJECTIVE:     Vital Signs (Most Recent)  Temp: 97.8 °F (36.6 °C)  "(03/22/18 0834)  Pulse: 78 (03/22/18 0834)  BP: (!) 162/77 (03/22/18 0834)  5' 8.5" (1.74 m)  78.1 kg (172 lb 2.9 oz)     Physical Exam:  Physical Exam   Constitutional: He is oriented to person, place, and time. He appears well-developed and well-nourished. No distress.   HENT:   Head: Normocephalic and atraumatic.   Eyes: EOM are normal.   Neck: Normal range of motion. Neck supple. No JVD present.   Cardiovascular: Normal rate, regular rhythm and intact distal pulses.    Pulmonary/Chest: Effort normal and breath sounds normal. No respiratory distress.   Abdominal:   Soft, ND, NT, incisions well approximated and healing well with no signs of infection x 3   Genitourinary: Testes normal.   Musculoskeletal: He exhibits no edema or deformity.   Neurological: He is alert and oriented to person, place, and time.   Skin: Skin is warm and dry. No rash noted. He is not diaphoretic. No erythema.   Psychiatric: He has a normal mood and affect. His behavior is normal. Judgment and thought content normal.   Vitals reviewed.        ASSESSMENT/PLAN:   71 y/o active male with small bilateral inguinal hernia (symptomatic on right) s/p laparoscopic bilateral inguinal hernia repair with mesh    - Doing well post-operatively with no apparent complications  - Continue lifting precautions  - Continue local wound care  - Follow up as needed      Haseeb Pandya MD  Surgery Resident, PGY-III  Pager: [phone]  3/22/2018 9:16 AM  "

## 2019-11-14 ENCOUNTER — OFFICE VISIT (OUTPATIENT)
Dept: OTOLARYNGOLOGY | Facility: CLINIC | Age: 74
End: 2019-11-14
Payer: MEDICARE

## 2019-11-14 VITALS
HEIGHT: 68 IN | SYSTOLIC BLOOD PRESSURE: 150 MMHG | DIASTOLIC BLOOD PRESSURE: 80 MMHG | BODY MASS INDEX: 25.94 KG/M2 | WEIGHT: 171.19 LBS

## 2019-11-14 DIAGNOSIS — R13.10 DYSPHAGIA, UNSPECIFIED TYPE: Primary | ICD-10-CM

## 2019-11-14 DIAGNOSIS — R09.82 POST-NASAL DRAINAGE: ICD-10-CM

## 2019-11-14 DIAGNOSIS — R09.81 NASAL CONGESTION: ICD-10-CM

## 2019-11-14 PROCEDURE — 1125F PR PAIN SEVERITY QUANTIFIED, PAIN PRESENT: ICD-10-PCS | Mod: S$GLB,,, | Performed by: OTOLARYNGOLOGY

## 2019-11-14 PROCEDURE — 99204 PR OFFICE/OUTPT VISIT, NEW, LEVL IV, 45-59 MIN: ICD-10-PCS | Mod: 25,S$GLB,, | Performed by: OTOLARYNGOLOGY

## 2019-11-14 PROCEDURE — 1101F PT FALLS ASSESS-DOCD LE1/YR: CPT | Mod: CPTII,S$GLB,, | Performed by: OTOLARYNGOLOGY

## 2019-11-14 PROCEDURE — 1101F PR PT FALLS ASSESS DOC 0-1 FALLS W/OUT INJ PAST YR: ICD-10-PCS | Mod: CPTII,S$GLB,, | Performed by: OTOLARYNGOLOGY

## 2019-11-14 PROCEDURE — 1159F MED LIST DOCD IN RCRD: CPT | Mod: S$GLB,,, | Performed by: OTOLARYNGOLOGY

## 2019-11-14 PROCEDURE — 31575 PR LARYNGOSCOPY, FLEXIBLE; DIAGNOSTIC: ICD-10-PCS | Mod: S$GLB,,, | Performed by: OTOLARYNGOLOGY

## 2019-11-14 PROCEDURE — 99204 OFFICE O/P NEW MOD 45 MIN: CPT | Mod: 25,S$GLB,, | Performed by: OTOLARYNGOLOGY

## 2019-11-14 PROCEDURE — 1159F PR MEDICATION LIST DOCUMENTED IN MEDICAL RECORD: ICD-10-PCS | Mod: S$GLB,,, | Performed by: OTOLARYNGOLOGY

## 2019-11-14 PROCEDURE — 31575 DIAGNOSTIC LARYNGOSCOPY: CPT | Mod: S$GLB,,, | Performed by: OTOLARYNGOLOGY

## 2019-11-14 PROCEDURE — 1125F AMNT PAIN NOTED PAIN PRSNT: CPT | Mod: S$GLB,,, | Performed by: OTOLARYNGOLOGY

## 2019-11-19 NOTE — PROGRESS NOTES
Chief Complaint   Patient presents with    Other     Trouble Swallowing         74 y.o. male presents with occasional feeling of something getting stuck in his throat when he swallows. It first happened a few weeks ago, then got better, but a few days ago he noted again feeling of some foods feeling stuck. He was seen in the ED when his symptoms first occurred and he was worried that it might be tetanus from a recent cut. He was treated for pharyngitis with Amoxicillin. He does note some increased nasal congestion and post nasal drainage. No fevers or facial pain.      Review of Systems     Constitutional: Negative for fatigue and unexpected weight change.   HENT: per HPI.  Eyes: Negative for visual disturbance.   Respiratory: Negative for shortness of breath, hemoptysis  Cardiovascular: Negative for chest pain and palpitations.   Genitourinary: Negative for dysuria and difficulty urinating.   Musculoskeletal: Negative for decreased ROM, back pain.   Skin: Negative for rash, sunburn, itching.   Neurological: Negative for dizziness and seizures.   Hematological: Negative for adenopathy. Does not bruise/bleed easily.   Psychiatric/Behavioral: Negative for agitation. The patient is not nervous/anxious.   Endocrine: Negative for rapid weight loss/weight gain, heat/cold intolerance.     Past Medical History:   Diagnosis Date    Arthritis     OA    Atrial fibrillation     Cancer     melanoma x 2   & basal cell    CKD (chronic kidney disease), stage III     H/O: gout     History of cardioversion 11/2013    no reoccurrence of af    Hypertension     Inguinal hernia recurrent bilateral     Tinnitus        Past Surgical History:   Procedure Laterality Date    ablation procedure for afib      ELBOW BURSA SURGERY Left 1998    achilles tendon    EYE SURGERY Bilateral 1991    kertodomy &  torn retina (left)    FRACTURE SURGERY Left 1984    5th finger    INGUINAL HERNIA REPAIR Bilateral 03/09/2018    Laparoscopic     KNEE ARTHROSCOPY Left 2004,  2003    knee arthroscopy Right     TONSILLECTOMY      vascectomy         family history is not on file.    Pt  reports that he quit smoking about 37 years ago. His smoking use included cigars. He has never used smokeless tobacco. He reports that he drinks about 3.0 - 4.0 standard drinks of alcohol per week.    Review of patient's allergies indicates:  No Known Allergies     Physical Exam    Vitals:    11/14/19 0832   BP: (!) 150/80     Body mass index is 26.03 kg/m².    Physical Exam   Constitutional: He is oriented to person, place, and time. He appears well-developed and well-nourished. No distress.   HENT:   Head: Normocephalic and atraumatic.   Right Ear: Hearing, tympanic membrane, external ear and ear canal normal. Tympanic membrane mobility is normal. No middle ear effusion. No decreased hearing is noted.   Left Ear: Hearing, tympanic membrane, external ear and ear canal normal. Tympanic membrane mobility is normal.  No middle ear effusion. No decreased hearing is noted.   Nose: Nose normal.   Mouth/Throat: Oropharynx is clear and moist.   Bilateral nasal cavities inspected, no polyps or purulent fluid seen.  Dry nasal mucosa bilaterally   Eyes: Pupils are equal, round, and reactive to light. Conjunctivae, EOM and lids are normal. Right eye exhibits no discharge. Left eye exhibits no discharge.   Neck: Trachea normal, normal range of motion and phonation normal. Neck supple. No tracheal tenderness present. No tracheal deviation, no edema and no erythema present. No thyroid mass and no thyromegaly present.   Cardiovascular: Normal heart sounds.   Pulmonary/Chest: Breath sounds normal. No stridor.   Abdominal: Soft.   Lymphadenopathy:     He has no cervical adenopathy.   Neurological: He is alert and oriented to person, place, and time.   Skin: Skin is warm and dry. No rash noted. He is not diaphoretic. No erythema. No pallor.   Psychiatric: He has a normal mood and affect.    Nursing note and vitals reviewed.    Procedure: Flexible laryngoscopy  In order to fully examine the upper aerodigestive tract, including the larynx, in a patient with a hyperactive gag reflex, flexible endoscopy is required.  After explaining the procedure and obtaining verbal consent, a timeout was performed with the patient's participation according to the universal protocol. Both nasal cavities were anesthetized with 4% Xylocaine spray mixed with Emery-Synephrine. The flexible laryngoscope was inserted into the nasal cavity and advanced to visualize the nasal cavity, nasopharynx, the posterior oropharynx, hypopharynx, and the endolarynx with the above findings noted. The scope was removed and the procedure terminated. The patient tolerated this procedure well without apparent complication.     Nasopharynx - the torus is clear. There are no lesions of the posterior wall.   Oropharynx - no lesions of the tongue base. There is no obvious fullness or asymmetry.  Hypopharynx - there are no lesions of the pyriform sinuses or postcricoid region    Larynx - there are no lesions of the supraglottic or glottic larynx. Vocal fold mobility is normal with complete closure.     Assessment     1. Dysphagia, unspecified type    2. Nasal congestion    3. Post-nasal drainage          Plan  In summary, Mr. Smlal is a 74 year old male with dry nasal mucosa, nasal congestion, PND and dysphagia. No abnormality on flexible laryngoscopy today, reassurance given. Recommend consistent use of nasal saline for improved mucociliary clearance. All questions were answered. If symptoms fail to improve, return to clinic, may need MBSS for further evaluation.

## 2020-06-02 ENCOUNTER — CLINICAL SUPPORT (OUTPATIENT)
Dept: REHABILITATION | Facility: HOSPITAL | Age: 75
End: 2020-06-02
Payer: MEDICARE

## 2020-06-02 DIAGNOSIS — M25.511 CHRONIC PAIN OF BOTH SHOULDERS: ICD-10-CM

## 2020-06-02 DIAGNOSIS — R29.898 WEAKNESS OF SHOULDER: ICD-10-CM

## 2020-06-02 DIAGNOSIS — G89.29 CHRONIC PAIN OF BOTH SHOULDERS: ICD-10-CM

## 2020-06-02 DIAGNOSIS — R29.3 POSTURE IMBALANCE: ICD-10-CM

## 2020-06-02 DIAGNOSIS — M54.2 CERVICAL PAIN (NECK): ICD-10-CM

## 2020-06-02 DIAGNOSIS — M25.512 CHRONIC PAIN OF BOTH SHOULDERS: ICD-10-CM

## 2020-06-02 PROCEDURE — 97161 PT EVAL LOW COMPLEX 20 MIN: CPT | Mod: PN

## 2020-06-02 PROCEDURE — 97110 THERAPEUTIC EXERCISES: CPT | Mod: PN

## 2020-06-02 NOTE — PROGRESS NOTES
[facility]  Physical Therapy Initial Evaluation    Name: Emiliano Small  [facility] Number: [id number]    Therapy Diagnosis:   Encounter Diagnoses   Name Primary?    Cervical pain (neck)     Chronic pain of both shoulders     Weakness of shoulder     Posture imbalance      Physician: Haja Ivey MD    Physician Orders: PT Eval and Treat   Medical Diagnosis from Referral:   M75.22 (ICD-10-CM) - Bicipital tendinitis of left shoulder   M75.120 (ICD-10-CM) - Complete tear of rotator cuff   M25.611 (ICD-10-CM) - Decreased range of motion of right shoulder       Evaluation Date: 6/2/2020  Authorization Period Expiration: 6/1/2021  Plan of Care Expiration: 9/2/2020  Visit # / Visits authorized: 1/ 1    Time In: 2:30 pm  Time Out: 3:15 pm  Total Billable Time: 45  minutes    Precautions: A- fib and L TKA (5 years ago)    Subjective   Date of onset: Several years ago  History of current condition - Emiliano reports: that he is in therapy for both shoulder and neck. Pt states pain has worsen for the past year. ANASTACIO: he was throwing hammer (olympic weight). Bench press and lifting weights cause shoulder pain. Pt has pain in the B anterior, posterior and lateral shoulder and pain in the cervical region. Pt describe pain as aching pain. Pt states sometimes pain travels down towards arms. Pt complain of numbness of L hand at times. Numbness is localized in the L forearm and fingers. Pt states he had vertigo about 1 weeks ago.Pt states neck and shoulder pain wakes him up at night.  Aggravating factors: holding the phone on his hears, throwing hammer, lifting  weights, pull ups. Easing factors: NSAID's and ice. Pt had injection on right shoulder 9 years ago.      Medical History:   Past Medical History:   Diagnosis Date    Arthritis     OA    Atrial fibrillation     Cancer     melanoma x 2   & basal cell    CKD (chronic kidney disease), stage III     H/O: gout     History of  cardioversion 11/2013    no reoccurrence of af    Hypertension     Inguinal hernia recurrent bilateral     Tinnitus        Surgical History:   Emiliano Small  has a past surgical history that includes Tonsillectomy; vascectomy; Elbow bursa surgery (Left, 1998); Knee arthroscopy (Left, 2004,  2003); knee arthroscopy (Right); Eye surgery (Bilateral, 1991); Fracture surgery (Left, 1984); ablation procedure for afib; and Inguinal hernia repair (Bilateral, 03/09/2018).    Medications:   Emiliano has a current medication list which includes the following prescription(s): aspirin, ergocalciferol (vitamin d2), fenofibrate, fish oil-omega-3 fatty acids, hydrocodone-acetaminophen, magnesium, ondansetron, polyethylene glycol, UNABLE TO FIND, and valsartan.    Allergies:   Review of patient's allergies indicates:  No Known Allergies     Imaging, None     Prior Therapy: yes. 9 years ago  Social History:  lives with their spouse  Occupation: Retired   Prior Level of Function: Independent   Current Level of Function: ADL's and IADL's     Pain:   Current 2/10, worst 6/10, best 0/10  B Shoulders   Current 3/10, worst 6/10, best 0/10 cervical   Location: bilateral shoulder and cervical   Description: See above   Aggravating Factors: see above   Easing Factors: see above     Pts goals: Return to exercises without any pain     Objective       Observation:     Posture Alignment: slouched posture;forward head;rounded shoulders    Shoulder rotation at rest: protracted     Sensation: Light touch: intact to light touch    DTR: intact to UE    GAIT DEVIATIONS: Emiliano displays No major deviation     Cervical Range of Motion:    Degrees Pain   Flexion WFL No      Extension 26 deg  No     Right Rotation Mod loss  Yes     Left Rotation Mod loss Yes      Right Side Bending 25 deg Yes    Left Side Bending 40 deg  Yes      Cervical Special Tests:  Compression: negative  Spurling's:  Positive L side       Pas  Active Range of Motion in   (degrees):   Shoulder Right Left   Flexion 174 deg with pain 172 with pain   Abduction 172 deg with pain 171 with pain    ER T3 T3    IR  T11 T11     Upper Extremity Strength  (R) UE  (L) UE    Shoulder flexion: 4/5 Shoulder flexion: 4/5   Shoulder Abduction: 4/5 Shoulder abduction: 4/5   Shoulder ER 4/5 Shoulder ER 4/5   Shoulder IR 4-/5 Shoulder IR 4/5   Lower Trap 4-/5 Lower Trap 4-/5   Middle Trap 4-/5 Middle Trap 4-/5   Rhomboids 4-/5 Rhomboids 4-/5       Special Tests:    Impingement   Right Left   Neer's  positive  positive   Marx-Lex  positive  positive     Rotator Cuff:     Right Left   Drop Arm Test  negative  negative   Subscapularis Lift Off Test  negative  negative   ER Lag Sign  negative  negative   IR Lag Sign  negative  negative   Empty Can Test  positive positive   Full Can Test positive positive   Scapular Retraction Test positive positive     Labrum:     Right Left   Sharkey's Test negative negative   Clunk Test negative negative       AC Joint/Biceps:     Right Left   AC Joint Compression Test negative negative   Speed's test positive positive       Palpation: Increase B rhomboid, infraspinatus, mai minor, teres major, LHOB pain during palpation. Increase B raghavendra-scapular tightness.   Scapula: protracted     Flexibility: decrease upper traps flexibility     Scapular Control/Dyskinesis:  None   SPDI:       CMS Impairment/Limitation/Restriction for FOTO Shoulder Survey  Status Limitation G-Code CMS Severity Modifier  Intake 76% 24% Current Status CJ - At least 20 percent but less than 40 percent  Predicted 73% 27% Goal Status+ CJ - At least 20 percent but less than 40 percent  D/C Status CJ **only report if this is a one time visit    TREATMENT   Treatment Time In: 3:00 pm  Treatment Time Out: 3:10 pm   Total Treatment time separate from Evaluation: 10 minutes    Emiliano received therapeutic exercises to develop strength, endurance, ROM, flexibility and posture for 10 minutes including:  Pec  stretch doorways   Upper traps stretch   Standing scapular retraction     Home Exercises and Patient Education Provided    Education provided:   - Perform HEP 2 times per week    Written Home Exercises Provided: Patient instructed to cont prior HEP.  Exercises were reviewed and Emiliano was able to demonstrate them prior to the end of the session.  Emiliano demonstrated good  understanding of the education provided.     See EMR under Patient Instructions for exercises provided 6/2/2020.    Assessment   Emiliano is a 74 y.o. male referred to outpatient Physical Therapy with a medical diagnosis of Bicipital tendinitis of left shoulder, Complete tear of rotator cuff, Decreased range of motion of right shoulder. Pt presents to therapy with B shoulder pain and cervical pain. Pt is having pain during recreational activities and gym exercises. Pt demonstrated poor sitting and standing posture indicating upper cross syndrome. Slouched posture and rounded shoulders were visible during observation. Decrease cervical range of motion with pain in the upper traps due to over activation of trapezius muscles. Pt was positive for Spurling test, but PT thinks it was falsi positive since pt demonstrated delay in response of pain. Pt has WFL B shoulder, but he has pain during motions. Pt was positive for impingement tests today. Pt main problem is posture syndrome causing shoulders and cervical issues. Pt will benefit from skilled PT services to decrease functional limitations.     Pt prognosis is Good.   Pt will benefit from skilled outpatient Physical Therapy to address the deficits stated above and in the chart below, provide pt/family education, and to maximize pt's level of independence.     Plan of care discussed with patient: Yes  Pt's spiritual, cultural and educational needs considered and patient is agreeable to the plan of care and goals as stated below:     Anticipated Barriers for therapy: None     Medical Necessity is  demonstrated by the following  History  Co-morbidities and personal factors that may impact the plan of care Co-morbidities:   Arthritis   OA   Atrial fibrillation   Cancer   melanoma x 2   & basal cell   CKD (chronic kidney disease), stage III   H/O: gout   History of cardioversion   no reoccurrence of af   Hypertension   Inguinal hernia recurrent bilateral   Tinnitus       Personal Factors:   age     low   Examination  Body Structures and Functions, activity limitations and participation restrictions that may impact the plan of care Body Regions:   B shoulder and cervical     Body Systems:    ROM  strength    Participation Restrictions:   Community activities      Activity limitations:   Learning and applying knowledge  no deficits    General Tasks and Commands  no deficits    Communication  no deficits    Mobility  lifting and carrying objects  Throwing     Self care  no deficits    Domestic Life  no deficits    Interactions/Relationships  no deficits    Life Areas  no deficits    Community and Social Life  community life  recreation and leisure         Complexity: low  See FOTO outcome assessment    Clinical Presentation stable and uncomplicated low   Decision Making/ Complexity Score: low     GOALS: Short Term Goals: 4 weeks  1.Report decreased in pain at worse less than  <   / =  5  /10  to increase tolerance for functional mobility. On going  2. Pt to improve Cervical and shoulder range of motion by 10% to allow for improved functional mobility to allow for improvement in IADLs. On going  3. Increased B shoulder  MMT 1/2 grade to increase tolerance for ADL and work activities. On going  4. Pt to report be conscious of impaired sitting and standing posture daily to decrease pain. On going  5. Pt to tolerate HEP to improve ROM and independence with ADL's. On going    Long Term Goals: 8 weeks  1.Report decreased in pain at worse less than  <   / =  2 /10  to increase tolerance for functional mobility  2.   Patient will demonstrate improved overall function per FOTO Survey to CJ = at least 20% but < 40% impaired, limited or restricted score or less.  3.Increased B Shoulders  MMT 1 grade to increase tolerance for ADL and work activities.  4. Pt to report and demonstrate proper posture in standing and sitting to decrease pain  5. Pt to be Independent with HEP to improve ROM and independence with ADL's.  6. Pt to improve Cervical range of motion by 75% to allow for improved functional mobility to allow for improvement in IADLs.     Plan   Plan of care Certification: 6/2/2020 to 9/2/2020    Outpatient Physical Therapy 2 times weekly for 12 weeks to include the following interventions: Manual Therapy, Moist Heat/ Ice, Neuromuscular Re-ed, Patient Education, Therapeutic Activites and Therapeutic Exercise.     Jose F Carlisle, PT

## 2020-06-10 ENCOUNTER — CLINICAL SUPPORT (OUTPATIENT)
Dept: REHABILITATION | Facility: HOSPITAL | Age: 75
End: 2020-06-10
Payer: MEDICARE

## 2020-06-10 DIAGNOSIS — M25.511 CHRONIC PAIN OF BOTH SHOULDERS: ICD-10-CM

## 2020-06-10 DIAGNOSIS — R29.3 POSTURE IMBALANCE: ICD-10-CM

## 2020-06-10 DIAGNOSIS — M54.2 CERVICAL PAIN (NECK): ICD-10-CM

## 2020-06-10 DIAGNOSIS — R29.898 WEAKNESS OF SHOULDER: ICD-10-CM

## 2020-06-10 DIAGNOSIS — M25.512 CHRONIC PAIN OF BOTH SHOULDERS: ICD-10-CM

## 2020-06-10 DIAGNOSIS — G89.29 CHRONIC PAIN OF BOTH SHOULDERS: ICD-10-CM

## 2020-06-10 PROCEDURE — 97110 THERAPEUTIC EXERCISES: CPT | Mod: PN,CQ

## 2020-06-10 NOTE — PROGRESS NOTES
Physical Therapy Daily Treatment Note     Name: Emiliano Rodas [facility] Number: [id number]    Therapy Diagnosis:   Encounter Diagnoses   Name Primary?    Cervical pain (neck)     Chronic pain of both shoulders     Weakness of shoulder     Posture imbalance      Physician: Haja Ivey MD    Visit Date: 6/10/2020    Physician Orders: PT Eval and Treat   Medical Diagnosis from Referral:   M75.22 (ICD-10-CM) - Bicipital tendinitis of left shoulder   M75.120 (ICD-10-CM) - Complete tear of rotator cuff   M25.611 (ICD-10-CM) - Decreased range of motion of right shoulder         Evaluation Date: 6/2/2020  Authorization Period Expiration: 6/1/2021  Plan of Care Expiration: 9/2/2020  Visit # / Visits authorized: 2/ 1     Time In: 0830  Time Out: 0845  Total Billable Time: 45  minutes     Precautions: A- fib and L TKA (5 years ago)      Subjective     Pt reports: feeling a little twinge in my right shoulder, pain is worse when I wake up in the morning .  He was somewhat compliant with home exercise program.  Response to previous treatment: fair  Functional change: ongoing    Pain: 2/10  Location: R shoulder      Objective     Emiliano received therapeutic exercises to develop strength, endurance, ROM, flexibility and posture for 10 minutes including:    UBE 3/3 ( some pain going back in posterior shoulder)  Pec stretch doorways 3x30 seconds  Upper traps stretch [id number]  Chin tucks x10 with 5 seconds  Standing scapular retraction   ER with wand x15  Flexion with wand x15  Rows green tubing 3x10  B straight arm pulldown RTB 3x10  ER Yellow tubing 2x10 B  IR Green tubing 2x10 B    Emiliano received the following manual therapy techniques:  were applied to the:  for  minutes, including:      Emiliano participated in neuromuscular re-education activities to improve:  for  minutes. The following activities were included:        Emiliano received the following direct contact modalities after being cleared for  contraindications:     Emiliano received the following supervised modalities after being cleared for contradictions:     Emiliano received hot pack for  minutes to .    Emiliano received cold pack for  minutes to .      Home Exercises Provided and Patient Education Provided     Education provided:   - Cont HEP    Written Home Exercises Provided: yes.  Exercises were reviewed and Emiliano was able to demonstrate them prior to the end of the session.  Emiliano demonstrated good  understanding of the education provided.     See EMR under Patient Instructions for exercises provided 6/10/2020.    Assessment     Pt tolerated session well today. Pt presents with slight forward head posture and rounded shoulders. Pt experienced some pain today in posterior shoulder/scapular region, did not increase past a 3/10. Pt ROM is more limited in R shoulder compared to L. No adverse affects to prescribed exercises today.  Emiliano is progressing well towards his goals.   Pt prognosis is Good.     Pt will continue to benefit from skilled outpatient physical therapy to address the deficits listed in the problem list box on initial evaluation, provide pt/family education and to maximize pt's level of independence in the home and community environment.     Pt's spiritual, cultural and educational needs considered and pt agreeable to plan of care and goals.     Anticipated barriers to physical therapy: none    GOALS: Short Term Goals: 4 weeks  1.Report decreased in pain at worse less than  <   / =  5  /10  to increase tolerance for functional mobility. On going  2. Pt to improve Cervical and shoulder range of motion by 10% to allow for improved functional mobility to allow for improvement in IADLs. On going  3. Increased B shoulder  MMT 1/2 grade to increase tolerance for ADL and work activities. On going  4. Pt to report be conscious of impaired sitting and standing posture daily to decrease pain. On going  5. Pt to tolerate HEP to improve  ROM and independence with ADL's. On going     Long Term Goals: 8 weeks  1.Report decreased in pain at worse less than  <   / =  2 /10  to increase tolerance for functional mobility  2.  Patient will demonstrate improved overall function per FOTO Survey to CJ = at least 20% but < 40% impaired, limited or restricted score or less.  3.Increased B Shoulders  MMT 1 grade to increase tolerance for ADL and work activities.  4. Pt to report and demonstrate proper posture in standing and sitting to decrease pain  5. Pt to be Independent with HEP to improve ROM and independence with ADL's.  6. Pt to improve Cervical range of motion by 75% to allow for improved functional mobility to allow for improvement in IADLs.       Plan     Outpatient Physical Therapy 2 times weekly for 12 weeks to include the following interventions: Manual Therapy, Moist Heat/ Ice, Neuromuscular Re-ed, Patient Education, Therapeutic Activites and Therapeutic Exercise.        Omari Olivares, PTA

## 2020-06-15 NOTE — PROGRESS NOTES
Physical Therapy Daily Treatment Note     Name: Emiliano Rodas [facility] Number: [id number]    Therapy Diagnosis:   Encounter Diagnoses   Name Primary?    Cervical pain (neck)     Chronic pain of both shoulders     Weakness of shoulder     Posture imbalance      Physician: Haja Ivey MD    Visit Date: 6/16/2020    Physician Orders: PT Eval and Treat   Medical Diagnosis from Referral:   M75.22 (ICD-10-CM) - Bicipital tendinitis of left shoulder   M75.120 (ICD-10-CM) - Complete tear of rotator cuff   M25.611 (ICD-10-CM) - Decreased range of motion of right shoulder         Evaluation Date: 6/2/2020  Authorization Period Expiration: 6/1/2021  Plan of Care Expiration: 9/2/2020  Visit # / Visits authorized: 2/ 1     Time In: 10:30 am  Time Out: 11:15 am  Total Billable Time: 45  minutes     Precautions: A- fib and L TKA (5 years ago)      Subjective     Pt reports: he cont wake up in the morning with cervical pain. Pt reports Shoulder ER with band caused lateral arm pain in both UE.  He was somewhat compliant with home exercise program.  Response to previous treatment: fair  Functional change: ongoing    Pain: 2/10  Location: R shoulder      Objective     Emiliano received therapeutic exercises to develop strength, endurance, ROM, flexibility and posture for 40 minutes including:    UBE 3/3   Pec stretch doorways 3x30 seconds  Upper traps stretch [id number]  Seated trunk extension w/ towel 1 x10   ER with (5#) wand  x15  Flexion with (5#) wand 20  Rows green TB 2x10 hold 2 sec  B straight arm pulldown GTB 2x10 hold 2 sec  ER Yellow tubing 2x10 B  IR Green tubing 2x10 B  Prone shoulder I's 2x10 2#  Wall clock YTB 1x10      Emiliano received the following manual therapy techniques:  were applied to the: B scapular mobs for 5 minutes, including:   Scapular mobs in all direction       Emiliano received hot pack for 10   minutes to B scapular .        Home Exercises Provided and Patient Education Provided      Education provided:   - Cont HEP    Written Home Exercises Provided: yes.  Exercises were reviewed and Emiliano was able to demonstrate them prior to the end of the session.  Emiliano demonstrated good  understanding of the education provided.     See EMR under Patient Instructions for exercises provided 6/10/2020.    Assessment     Pt tolerated session well today. Pt presented with provocation of B lateral shoulder pain during therapeutic exercises. V/c's were required to perform exercise with pain free range of motion. Pt presents with slight forward head posture and rounded shoulders. 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 Pt was able to tolerated progression of periscapular muscle strength to improve posture. Addition of wall clock, prone I's and seated trunk extension with minor provocation of lateral shoulder pain. He cont with dysfunction of scapulothoracic. Scapular dyskinesia was noted during wall clock and shoulder ER. During manual therapy, several tissue dysfunction and muscle tightness of rhomboids muscles and other periscapular muscles. Pt might benefit from dry needling at periscapular muscles.     Emiliano is progressing well towards his goals.   Pt prognosis is Good.     Pt will continue to benefit from skilled outpatient physical therapy to address the deficits listed in the problem list box on initial evaluation, provide pt/family education and to maximize pt's level of independence in the home and community environment.     Pt's spiritual, cultural and educational needs considered and pt agreeable to plan of care and goals.     Anticipated barriers to physical therapy: none    GOALS: Short Term Goals: 4 weeks  1.Report decreased in pain at worse less than  <   / =  5  /10  to increase tolerance for functional mobility. On going  2. Pt to improve Cervical and shoulder range of motion by 10% to allow for improved functional mobility to allow for improvement in  IADLs. On going  3. Increased B shoulder  MMT 1/2 grade to increase tolerance for ADL and work activities. On going  4. Pt to report be conscious of impaired sitting and standing posture daily to decrease pain. On going  5. Pt to tolerate HEP to improve ROM and independence with ADL's. On going     Long Term Goals: 8 weeks  1.Report decreased in pain at worse less than  <   / =  2 /10  to increase tolerance for functional mobility  2.  Patient will demonstrate improved overall function per FOTO Survey to CJ = at least 20% but < 40% impaired, limited or restricted score or less.  3.Increased B Shoulders  MMT 1 grade to increase tolerance for ADL and work activities.  4. Pt to report and demonstrate proper posture in standing and sitting to decrease pain  5. Pt to be Independent with HEP to improve ROM and independence with ADL's.  6. Pt to improve Cervical range of motion by 75% to allow for improved functional mobility to allow for improvement in IADLs.       Plan     Outpatient Physical Therapy 2 times weekly for 12 weeks to include the following interventions: Manual Therapy, Moist Heat/ Ice, Neuromuscular Re-ed, Patient Education, Therapeutic Activites and Therapeutic Exercise.        Jose F Carlisle, PT

## 2020-06-16 ENCOUNTER — CLINICAL SUPPORT (OUTPATIENT)
Dept: REHABILITATION | Facility: HOSPITAL | Age: 75
End: 2020-06-16
Payer: MEDICARE

## 2020-06-16 DIAGNOSIS — M25.511 CHRONIC PAIN OF BOTH SHOULDERS: ICD-10-CM

## 2020-06-16 DIAGNOSIS — G89.29 CHRONIC PAIN OF BOTH SHOULDERS: ICD-10-CM

## 2020-06-16 DIAGNOSIS — M25.512 CHRONIC PAIN OF BOTH SHOULDERS: ICD-10-CM

## 2020-06-16 DIAGNOSIS — R29.3 POSTURE IMBALANCE: ICD-10-CM

## 2020-06-16 DIAGNOSIS — M54.2 CERVICAL PAIN (NECK): ICD-10-CM

## 2020-06-16 DIAGNOSIS — R29.898 WEAKNESS OF SHOULDER: ICD-10-CM

## 2020-06-16 PROCEDURE — 97110 THERAPEUTIC EXERCISES: CPT | Mod: PN

## 2020-06-18 ENCOUNTER — CLINICAL SUPPORT (OUTPATIENT)
Dept: REHABILITATION | Facility: HOSPITAL | Age: 75
End: 2020-06-18
Payer: MEDICARE

## 2020-06-18 DIAGNOSIS — M25.511 CHRONIC PAIN OF BOTH SHOULDERS: ICD-10-CM

## 2020-06-18 DIAGNOSIS — M25.512 CHRONIC PAIN OF BOTH SHOULDERS: ICD-10-CM

## 2020-06-18 DIAGNOSIS — M54.2 CERVICAL PAIN (NECK): ICD-10-CM

## 2020-06-18 DIAGNOSIS — R29.3 POSTURE IMBALANCE: ICD-10-CM

## 2020-06-18 DIAGNOSIS — G89.29 CHRONIC PAIN OF BOTH SHOULDERS: ICD-10-CM

## 2020-06-18 DIAGNOSIS — R29.898 WEAKNESS OF SHOULDER: ICD-10-CM

## 2020-06-18 PROCEDURE — 97110 THERAPEUTIC EXERCISES: CPT | Mod: PN

## 2020-06-18 PROCEDURE — 97140 MANUAL THERAPY 1/> REGIONS: CPT | Mod: PN

## 2020-06-22 ENCOUNTER — CLINICAL SUPPORT (OUTPATIENT)
Dept: REHABILITATION | Facility: HOSPITAL | Age: 75
End: 2020-06-22
Payer: MEDICARE

## 2020-06-22 DIAGNOSIS — R29.898 WEAKNESS OF SHOULDER: ICD-10-CM

## 2020-06-22 DIAGNOSIS — G89.29 CHRONIC PAIN OF BOTH SHOULDERS: ICD-10-CM

## 2020-06-22 DIAGNOSIS — M25.511 CHRONIC PAIN OF BOTH SHOULDERS: ICD-10-CM

## 2020-06-22 DIAGNOSIS — R29.3 POSTURE IMBALANCE: ICD-10-CM

## 2020-06-22 DIAGNOSIS — M25.512 CHRONIC PAIN OF BOTH SHOULDERS: ICD-10-CM

## 2020-06-22 DIAGNOSIS — M54.2 CERVICAL PAIN (NECK): ICD-10-CM

## 2020-06-22 PROCEDURE — 97110 THERAPEUTIC EXERCISES: CPT | Mod: PN

## 2020-06-22 PROCEDURE — 97140 MANUAL THERAPY 1/> REGIONS: CPT | Mod: PN

## 2020-06-22 NOTE — PROGRESS NOTES
Physical Therapy Daily Treatment Note     Name: Emiliano Rodas [facility] Number: [id number]    Therapy Diagnosis:   Encounter Diagnoses   Name Primary?    Cervical pain (neck)     Chronic pain of both shoulders     Weakness of shoulder     Posture imbalance      Physician: Haja Ivey MD    Visit Date: 6/22/2020    Physician Orders: PT Eval and Treat   Medical Diagnosis from Referral:   M75.22 (ICD-10-CM) - Bicipital tendinitis of left shoulder   M75.120 (ICD-10-CM) - Complete tear of rotator cuff   M25.611 (ICD-10-CM) - Decreased range of motion of right shoulder         Evaluation Date: 6/2/2020  Authorization Period Expiration: 6/2/2021  Plan of Care Expiration: 9/2/2020  Visit # / Visits authorized: 235     Time In: 1000  Time Out: 1046  Total Billable Time: 46  minutes     Precautions: A- fib and L TKA (5 years ago)      Subjective     Pt reports: bench pressed this weekend after returning to the gym after 3 months. Did not work at past volume. Toned it down. Pain in L shoulder with bench press. Some pain B shoulders this morning.     He was somewhat compliant with home exercise program.  Response to previous treatment: fair  Functional change: ongoing    Pain: 2/10  Location: R shoulder      Objective     Emiliano received therapeutic exercises to develop strength, endurance, ROM, flexibility and posture for 18 minutes including:    UBE 3/3   Pec stretch doorways 3x30 seconds  lat pulldown 30# 2x12  Standing ER GTB 2x15 ea  Wall clock YTB 2x5  Prone shoulder I's 2x10 2#  Prone shoulder T's 2x10 2#   Prone shoulder Y's 2x10 1#    Emiliano received the following manual therapy techniques:  were applied to the: B scapular mobs for 15 minutes, including:   Scapular mobs in all direction   +sub occipital release  +STM to B upper trap   +cervical manual distraction  +cervical ROM (B rot and lat flex)    Patient provided written and verbal consent to receive functional dry needling at today's visit  (see consent form scanned into chart). Pt cleared of contraindications and verbal and written consent acquired. Pt given option of copy of consent form. Pt educated on benefits and potential side effects of DN.   FDN performed to B upper traps, B supraspinatus, B infraspinatus using fanning technique for B upper traps and unilateral twisting techniques for B supraspinatus and infraspinatus with pt prone. FDN performed to reduce pain and muscle tension, promote blood flow, and improve ROM and function x 27 minutes. Pt reports familiar pain with B infraspinatus and soreness with B upper traps. Several localized twitch response observed in R upper trap. Pt was educated on what to expect following the procedure and he verbalized understanding. Dry needling performed by Freddy Banerjee, PT, DPT.       Emiliano received hot pack for 0   minutes to B scapular .        Home Exercises Provided and Patient Education Provided     Education provided:   - Cont HEP    Written Home Exercises Provided: yes.  Exercises were reviewed and Emiliano was able to demonstrate them prior to the end of the session.  Emiliano demonstrated good  understanding of the education provided.     See EMR under Patient Instructions for exercises provided 6/10/2020.    Assessment     Pt tolerated session well today. He presents with tenderness to lateral B scapula, poor posture, and weak posterior shoulder musculature. He also presents with cervical/upper trap pain and tenderness. Introduced pt to dry needling today to reduce soft tissue restrictions in B upper traps and to reduce tenderness and improve blood flow to B rotator cuff musculature. See above in OBJECTIVE for details about dry needling. Pt reports expected muscle fatigue and minimal posterior shoulder pain with resisted ER.     Emiliano is progressing well towards his goals.   Pt prognosis is Good.     Pt will continue to benefit from skilled outpatient physical therapy to address the deficits  listed in the problem list box on initial evaluation, provide pt/family education and to maximize pt's level of independence in the home and community environment.     Pt's spiritual, cultural and educational needs considered and pt agreeable to plan of care and goals.     Anticipated barriers to physical therapy: none    GOALS: Short Term Goals: 4 weeks   1.Report decreased in pain at worse less than  <   / =  5  /10  to increase tolerance for functional mobility. On going  2. Pt to improve Cervical and shoulder range of motion by 10% to allow for improved functional mobility to allow for improvement in IADLs. On going  3. Increased B shoulder  MMT 1/2 grade to increase tolerance for ADL and work activities. On going  4. Pt to report be conscious of impaired sitting and standing posture daily to decrease pain. On going  5. Pt to tolerate HEP to improve ROM and independence with ADL's. On going     Long Term Goals: 8 weeks  1.Report decreased in pain at worse less than  <   / =  2 /10  to increase tolerance for functional mobility  2.  Patient will demonstrate improved overall function per FOTO Survey to CJ = at least 20% but < 40% impaired, limited or restricted score or less.  3.Increased B Shoulders  MMT 1 grade to increase tolerance for ADL and work activities.  4. Pt to report and demonstrate proper posture in standing and sitting to decrease pain  5. Pt to be Independent with HEP to improve ROM and independence with ADL's.  6. Pt to improve Cervical range of motion by 75% to allow for improved functional mobility to allow for improvement in IADLs.       Plan     Outpatient Physical Therapy 2 times weekly for 12 weeks to include the following interventions: Manual Therapy, Moist Heat/ Ice, Neuromuscular Re-ed, Patient Education, Therapeutic Activites and Therapeutic Exercise.     Assess response to dry needling. Continue as appropriate. Progress with periscapular strengthening.       Freddy Banerjee, PT,  DPT  6/22/2020

## 2020-06-25 ENCOUNTER — CLINICAL SUPPORT (OUTPATIENT)
Dept: REHABILITATION | Facility: HOSPITAL | Age: 75
End: 2020-06-25
Payer: MEDICARE

## 2020-06-25 DIAGNOSIS — G89.29 CHRONIC PAIN OF BOTH SHOULDERS: ICD-10-CM

## 2020-06-25 DIAGNOSIS — M54.2 CERVICAL PAIN (NECK): ICD-10-CM

## 2020-06-25 DIAGNOSIS — R29.898 WEAKNESS OF SHOULDER: ICD-10-CM

## 2020-06-25 DIAGNOSIS — M25.512 CHRONIC PAIN OF BOTH SHOULDERS: ICD-10-CM

## 2020-06-25 DIAGNOSIS — M25.511 CHRONIC PAIN OF BOTH SHOULDERS: ICD-10-CM

## 2020-06-25 DIAGNOSIS — R29.3 POSTURE IMBALANCE: ICD-10-CM

## 2020-06-25 PROCEDURE — 97110 THERAPEUTIC EXERCISES: CPT | Mod: PN

## 2020-06-25 PROCEDURE — 97140 MANUAL THERAPY 1/> REGIONS: CPT | Mod: PN

## 2020-06-25 NOTE — PROGRESS NOTES
"    Physical Therapy Daily Treatment Note     Name: Emiliano Rodas [facility] Number: [id number]    Therapy Diagnosis:   Encounter Diagnoses   Name Primary?    Cervical pain (neck)     Chronic pain of both shoulders     Weakness of shoulder     Posture imbalance      Physician: Haja Ivey MD    Visit Date: 6/25/2020    Physician Orders: PT Eval and Treat   Medical Diagnosis from Referral:   M75.22 (ICD-10-CM) - Bicipital tendinitis of left shoulder   M75.120 (ICD-10-CM) - Complete tear of rotator cuff   M25.611 (ICD-10-CM) - Decreased range of motion of right shoulder         Evaluation Date: 6/2/2020  Authorization Period Expiration: 6/2/2021  Plan of Care Expiration: 9/2/2020  Visit # / Visits authorized: 5/20     Time In: 1030  Time Out: 1117  Total Billable Time: 47  minutes     Precautions: A- fib and L TKA (5 years ago)      Subjective     Pt reports: felt better with dry needling last session. Has been sore in the morning but no increased pain. Worked out back at the gym.       He was somewhat compliant with home exercise program.  Response to previous treatment: fair  Functional change: ongoing    Pain: 2/10  Location: R shoulder      Objective     Emiliano received therapeutic exercises to develop strength, endurance, ROM, flexibility and posture for 22 minutes including:    UBE 3/3   Pec stretch doorways 3x30 seconds  lat pulldown 30# 2x12  Standing ER GTB 2x15 ea  Wall clock YTB 2x5  +matrix upright row 55# 2x30 (after dry needling)  Prone shoulder I's 2x15 2# c/ 3" holds  Prone shoulder T's 2x15 2# c/ 3" holds  Prone shoulder Y's 2x15 1# c/ 3" holds    Emiliano received the following manual therapy techniques:  were applied to the: B scapular mobs for 25 minutes, including:    Patient provided written and verbal consent to receive functional dry needling at today's visit (see consent form scanned into chart). Pt cleared of contraindications and verbal and written consent acquired. Pt given " option of copy of consent form. Pt educated on benefits and potential side effects of DN.   FDN performed to B upper traps, B supraspinatus, B infraspinatus, B latissimus dorsi using fanning technique for B upper traps and unilateral twisting techniques for B supraspinatus, infraspinatus, latissimus dorsi with pt prone. FDN performed to reduce pain and muscle tension, promote blood flow, and improve ROM and function x 25 minutes. Pt reports familiar pain with B infraspinatus and soreness with B upper traps and B latissimus dorsi. Several localized twitch response observed in L upper trap. Pt was educated on what to expect following the procedure and he verbalized understanding. Dry needling performed by Freddy Banerjee, PT, DPT.       Home Exercises Provided and Patient Education Provided     Education provided:   - Cont HEP    Written Home Exercises Provided: yes.  Exercises were reviewed and Emiliano was able to demonstrate them prior to the end of the session.  Emiliano demonstrated good  understanding of the education provided.     See EMR under Patient Instructions for exercises provided 6/10/2020.    Assessment     Pt tolerated session well today. He presents with tenderness to lateral B scapula, poor posture, and weak posterior shoulder musculature. He also presents with cervical/upper trap pain and tenderness. Continued with dry needling today to reduce soft tissue restrictions in B upper traps and to reduce tenderness and improve blood flow to B rotator cuff musculature. See above in OBJECTIVE for details about dry needling. Therex performed to increase blood flow and metabolic overload to posterior shoulder musculature. Minimal pain in R shoulder with prone horiz abd.     Emiliano is progressing well towards his goals.   Pt prognosis is Good.     Pt will continue to benefit from skilled outpatient physical therapy to address the deficits listed in the problem list box on initial evaluation, provide pt/family  education and to maximize pt's level of independence in the home and community environment.     Pt's spiritual, cultural and educational needs considered and pt agreeable to plan of care and goals.     Anticipated barriers to physical therapy: none    GOALS: Short Term Goals: 4 weeks   1.Report decreased in pain at worse less than  <   / =  5  /10  to increase tolerance for functional mobility. On going  2. Pt to improve Cervical and shoulder range of motion by 10% to allow for improved functional mobility to allow for improvement in IADLs. On going  3. Increased B shoulder  MMT 1/2 grade to increase tolerance for ADL and work activities. On going  4. Pt to report be conscious of impaired sitting and standing posture daily to decrease pain. On going  5. Pt to tolerate HEP to improve ROM and independence with ADL's. On going     Long Term Goals: 8 weeks  1.Report decreased in pain at worse less than  <   / =  2 /10  to increase tolerance for functional mobility  2.  Patient will demonstrate improved overall function per FOTO Survey to CJ = at least 20% but < 40% impaired, limited or restricted score or less.  3.Increased B Shoulders  MMT 1 grade to increase tolerance for ADL and work activities.  4. Pt to report and demonstrate proper posture in standing and sitting to decrease pain  5. Pt to be Independent with HEP to improve ROM and independence with ADL's.  6. Pt to improve Cervical range of motion by 75% to allow for improved functional mobility to allow for improvement in IADLs.       Plan     Outpatient Physical Therapy 2 times weekly for 12 weeks to include the following interventions: Manual Therapy, Moist Heat/ Ice, Neuromuscular Re-ed, Patient Education, Therapeutic Activites and Therapeutic Exercise.     Assess response to dry needling. Continue as appropriate. Progress with periscapular strengthening.       Freddy Banerjee, PT, DPT  6/25/2020

## 2020-06-29 ENCOUNTER — CLINICAL SUPPORT (OUTPATIENT)
Dept: REHABILITATION | Facility: HOSPITAL | Age: 75
End: 2020-06-29
Payer: MEDICARE

## 2020-06-29 DIAGNOSIS — M25.511 CHRONIC PAIN OF BOTH SHOULDERS: ICD-10-CM

## 2020-06-29 DIAGNOSIS — M54.2 CERVICAL PAIN (NECK): ICD-10-CM

## 2020-06-29 DIAGNOSIS — R29.3 POSTURE IMBALANCE: ICD-10-CM

## 2020-06-29 DIAGNOSIS — R29.898 WEAKNESS OF SHOULDER: ICD-10-CM

## 2020-06-29 DIAGNOSIS — G89.29 CHRONIC PAIN OF BOTH SHOULDERS: ICD-10-CM

## 2020-06-29 DIAGNOSIS — M25.512 CHRONIC PAIN OF BOTH SHOULDERS: ICD-10-CM

## 2020-06-29 PROCEDURE — 97140 MANUAL THERAPY 1/> REGIONS: CPT | Mod: PN

## 2020-06-29 PROCEDURE — 97110 THERAPEUTIC EXERCISES: CPT | Mod: PN

## 2020-06-29 NOTE — PROGRESS NOTES
"    Physical Therapy Daily Treatment Note     Name: Emiliano Rodas [facility] Number: [id number]    Therapy Diagnosis:   Encounter Diagnoses   Name Primary?    Cervical pain (neck)     Chronic pain of both shoulders     Weakness of shoulder     Posture imbalance       Physician: Haja Ivey MD    Visit Date: 6/29/2020    Physician Orders: PT Eval and Treat   Medical Diagnosis from Referral:   M75.22 (ICD-10-CM) - Bicipital tendinitis of left shoulder   M75.120 (ICD-10-CM) - Complete tear of rotator cuff   M25.611 (ICD-10-CM) - Decreased range of motion of right shoulder         Evaluation Date: 6/2/2020  Authorization Period Expiration: 6/2/2021  Plan of Care Expiration: 9/2/2020  Visit # / Visits authorized: 6/20     Time In: 1000  Time Out: 1100  Total Billable Time: 55 minutes     Precautions: A- fib and L TKA (5 years ago)      Subjective     Pt reports: Felt better after last visit. Reports pain after throwing javelin and with bench press.       He was somewhat compliant with home exercise program.  Response to previous treatment: fair  Functional change: ongoing    Pain: 2/10  Location: R shoulder      Objective     Emiliano received therapeutic exercises to develop strength, endurance, ROM, flexibility and posture for 40 minutes including:    UBE 3/3   Pec stretch doorways 3x30 seconds  lat pulldown 35# 2x15  Standing ER 90* YTB 2x12 ea   Seated ER 90* c/ ecc control 5# 2x12   Wall clock YTB 2x5  +matrix upright row 55# 2x30   Prone shoulder I's 2x15 2# c/ 3" holds  Prone shoulder T's 2x15 2# c/ 3" holds  Prone shoulder Y's 2x15 1# c/ 3" holds    NV: reassess    Emiliano received the following manual therapy techniques:  were applied to the: B scapular mobs for 15minutes, including:  +STM to B upper traps    Patient provided written and verbal consent to receive functional dry needling at today's visit (see consent form scanned into chart). Pt cleared of contraindications and verbal and written " consent acquired. Pt given option of copy of consent form. Pt educated on benefits and potential side effects of DN.   FDN performed to B upper traps using fanning technique for B upper traps with pt supine. FDN performed to reduce pain and muscle tension, promote blood flow, and improve ROM and function x 10 minutes. Pt reports familiar pain with  soreness with B upper traps. Several localized twitch response observed in R upper trap. Pt was educated on what to expect following the procedure and he verbalized understanding. Dry needling performed by Freddy Banerjee, PT, DPT.       Home Exercises Provided and Patient Education Provided     Education provided:   - Cont HEP    Written Home Exercises Provided: yes.  Exercises were reviewed and Emiliano was able to demonstrate them prior to the end of the session.  Emiliano demonstrated good  understanding of the education provided.     See EMR under Patient Instructions for exercises provided 6/10/2020.    Assessment     Pt tolerated session well today. He presents with tenderness to lateral B scapula, poor posture, and weak posterior shoulder musculature. He also presents with cervical/upper trap pain and tenderness. Continued with dry needling today to reduce soft tissue restrictions in B upper traps. See above in OBJECTIVE for details about dry needling. Added rotator cuff strengthening at 90* abduction. Tactile cueing required for standing ER for form. Pt reports minimal posterior/lateral shoulder pain with seated ER 90*.     Emiliano is progressing well towards his goals.   Pt prognosis is Good.     Pt will continue to benefit from skilled outpatient physical therapy to address the deficits listed in the problem list box on initial evaluation, provide pt/family education and to maximize pt's level of independence in the home and community environment.     Pt's spiritual, cultural and educational needs considered and pt agreeable to plan of care and goals.      Anticipated barriers to physical therapy: none    GOALS: Short Term Goals: 4 weeks   1.Report decreased in pain at worse less than  <   / =  5  /10  to increase tolerance for functional mobility. On going  2. Pt to improve Cervical and shoulder range of motion by 10% to allow for improved functional mobility to allow for improvement in IADLs. On going  3. Increased B shoulder  MMT 1/2 grade to increase tolerance for ADL and work activities. On going  4. Pt to report be conscious of impaired sitting and standing posture daily to decrease pain. On going  5. Pt to tolerate HEP to improve ROM and independence with ADL's. On going     Long Term Goals: 8 weeks  1.Report decreased in pain at worse less than  <   / =  2 /10  to increase tolerance for functional mobility  2.  Patient will demonstrate improved overall function per FOTO Survey to CJ = at least 20% but < 40% impaired, limited or restricted score or less.  3.Increased B Shoulders  MMT 1 grade to increase tolerance for ADL and work activities.  4. Pt to report and demonstrate proper posture in standing and sitting to decrease pain  5. Pt to be Independent with HEP to improve ROM and independence with ADL's.  6. Pt to improve Cervical range of motion by 75% to allow for improved functional mobility to allow for improvement in IADLs.       Plan     Outpatient Physical Therapy 2 times weekly for 12 weeks to include the following interventions: Manual Therapy, Moist Heat/ Ice, Neuromuscular Re-ed, Patient Education, Therapeutic Activites and Therapeutic Exercise.     Assess response to dry needling. Continue as appropriate. Progress with periscapular strengthening.       Freddy Banerjee, PT, DPT  6/29/2020

## 2020-07-01 ENCOUNTER — CLINICAL SUPPORT (OUTPATIENT)
Dept: REHABILITATION | Facility: HOSPITAL | Age: 75
End: 2020-07-01
Payer: MEDICARE

## 2020-07-01 DIAGNOSIS — G89.29 CHRONIC PAIN OF BOTH SHOULDERS: ICD-10-CM

## 2020-07-01 DIAGNOSIS — R29.3 POSTURE IMBALANCE: ICD-10-CM

## 2020-07-01 DIAGNOSIS — M25.511 CHRONIC PAIN OF BOTH SHOULDERS: ICD-10-CM

## 2020-07-01 DIAGNOSIS — M25.512 CHRONIC PAIN OF BOTH SHOULDERS: ICD-10-CM

## 2020-07-01 DIAGNOSIS — R29.898 WEAKNESS OF SHOULDER: ICD-10-CM

## 2020-07-01 DIAGNOSIS — M54.2 CERVICAL PAIN (NECK): ICD-10-CM

## 2020-07-01 PROCEDURE — 97140 MANUAL THERAPY 1/> REGIONS: CPT | Mod: PN

## 2020-07-01 PROCEDURE — 97110 THERAPEUTIC EXERCISES: CPT | Mod: PN

## 2020-07-01 NOTE — PROGRESS NOTES
"    Physical Therapy Daily Treatment Note     Name: Emiliano Rodas [facility] Number: [id number]    Therapy Diagnosis:   No diagnosis found.   Physician: Haja Ivey MD    Visit Date: 7/1/2020    Physician Orders: PT Eval and Treat   Medical Diagnosis from Referral:   M75.22 (ICD-10-CM) - Bicipital tendinitis of left shoulder   M75.120 (ICD-10-CM) - Complete tear of rotator cuff   M25.611 (ICD-10-CM) - Decreased range of motion of right shoulder         Evaluation Date: 6/2/2020  Authorization Period Expiration: 6/2/2021  Plan of Care Expiration: 9/2/2020  Visit # / Visits authorized: 6/20     Time In: 1000  Time Out: 1050  Total Billable Time: 47 minutes     Precautions: A- fib and L TKA (5 years ago)      Subjective     Pt reports: feels sore and stiff in the morning. Had some pain after throwing but not intense. Usually needs some time to warm up for throwing.       He was somewhat compliant with home exercise program.  Response to previous treatment: fair  Functional change: ongoing    Pain: 2/10  Location: R shoulder      Objective     Emiliano received therapeutic exercises to develop strength, endurance, ROM, flexibility and posture for 40 minutes including:    UBE 3/3   Pec stretch doorways 3x30 seconds  lat pulldown 35# 2x15  Seated IR 90* RTB 2x12 ea   Seated ER 90* c/ ecc control 5# 2x12   Wall clock YTB 2x5  +matrix upright row 55# 2x30   Prone shoulder I's 2x15 2# c/ 3" holds  Prone shoulder T's 2x15 2# c/ 3" holds  Prone shoulder Y's 2x15 1# c/ 3" holds    NV: reassess, K-tape if needed, 90* IR for warmup HEP    Emiliano received the following manual therapy techniques:  were applied to the: B scapular mobs for 10 minutes, including:  Cervical manual distraction and PROM lat flexion and rotation  STM to B upper traps        Home Exercises Provided and Patient Education Provided     Education provided:   - Cont HEP  - new warmup HEP before throwing    Written Home Exercises Provided: " yes.  Exercises were reviewed and Emiliano was able to demonstrate them prior to the end of the session.  Emiliano demonstrated good  understanding of the education provided.     See EMR under Patient Instructions for exercises provided 7/1/2020.    Assessment     Pt tolerated session well today. Continues with rounded shoulders, increased thoracic kyphosis, and poor rotator cuff strength. Pt remains very active outside of PT especially with resistance training and overhead throwing. Continue to focus treatment on strengthening shoulders for overhead throwing. Gave HEP for warmup routine before throwing.     Emiliano is progressing well towards his goals.   Pt prognosis is Good.     Pt will continue to benefit from skilled outpatient physical therapy to address the deficits listed in the problem list box on initial evaluation, provide pt/family education and to maximize pt's level of independence in the home and community environment.     Pt's spiritual, cultural and educational needs considered and pt agreeable to plan of care and goals.     Anticipated barriers to physical therapy: none    GOALS: Short Term Goals: 4 weeks   1.Report decreased in pain at worse less than  <   / =  5  /10  to increase tolerance for functional mobility. On going  2. Pt to improve Cervical and shoulder range of motion by 10% to allow for improved functional mobility to allow for improvement in IADLs. On going  3. Increased B shoulder  MMT 1/2 grade to increase tolerance for ADL and work activities. On going  4. Pt to report be conscious of impaired sitting and standing posture daily to decrease pain. On going  5. Pt to tolerate HEP to improve ROM and independence with ADL's. On going     Long Term Goals: 8 weeks  1.Report decreased in pain at worse less than  <   / =  2 /10  to increase tolerance for functional mobility  2.  Patient will demonstrate improved overall function per FOTO Survey to CJ = at least 20% but < 40% impaired,  limited or restricted score or less.  3.Increased B Shoulders  MMT 1 grade to increase tolerance for ADL and work activities.  4. Pt to report and demonstrate proper posture in standing and sitting to decrease pain  5. Pt to be Independent with HEP to improve ROM and independence with ADL's.  6. Pt to improve Cervical range of motion by 75% to allow for improved functional mobility to allow for improvement in IADLs.       Plan     Outpatient Physical Therapy 2 times weekly for 12 weeks to include the following interventions: Manual Therapy, Moist Heat/ Ice, Neuromuscular Re-ed, Patient Education, Therapeutic Activites and Therapeutic Exercise.     Assess response to dry needling. Continue as appropriate. Progress with periscapular strengthening.       Freddy Banerjee, PT, DPT  7/1/2020

## 2020-07-15 ENCOUNTER — CLINICAL SUPPORT (OUTPATIENT)
Dept: REHABILITATION | Facility: HOSPITAL | Age: 75
End: 2020-07-15
Payer: MEDICARE

## 2020-07-15 DIAGNOSIS — R29.3 POSTURE IMBALANCE: ICD-10-CM

## 2020-07-15 DIAGNOSIS — G89.29 CHRONIC PAIN OF BOTH SHOULDERS: ICD-10-CM

## 2020-07-15 DIAGNOSIS — M54.2 CERVICAL PAIN (NECK): ICD-10-CM

## 2020-07-15 DIAGNOSIS — R29.898 WEAKNESS OF SHOULDER: ICD-10-CM

## 2020-07-15 DIAGNOSIS — M25.511 CHRONIC PAIN OF BOTH SHOULDERS: ICD-10-CM

## 2020-07-15 DIAGNOSIS — M25.512 CHRONIC PAIN OF BOTH SHOULDERS: ICD-10-CM

## 2020-07-15 PROCEDURE — 97140 MANUAL THERAPY 1/> REGIONS: CPT | Mod: PN

## 2020-07-15 PROCEDURE — 97110 THERAPEUTIC EXERCISES: CPT | Mod: PN

## 2020-07-15 NOTE — PROGRESS NOTES
Physical Therapy Daily Treatment Note     Name: Emiliano Rodas [facility] Number: [id number]    Therapy Diagnosis:   Encounter Diagnoses   Name Primary?    Cervical pain (neck)     Chronic pain of both shoulders     Weakness of shoulder     Posture imbalance       Physician: Haja Ivey MD    Visit Date: 7/15/2020    Physician Orders: PT Eval and Treat   Medical Diagnosis from Referral:   M75.22 (ICD-10-CM) - Bicipital tendinitis of left shoulder   M75.120 (ICD-10-CM) - Complete tear of rotator cuff   M25.611 (ICD-10-CM) - Decreased range of motion of right shoulder         Evaluation Date: 6/2/2020  Authorization Period Expiration: 6/2/2021  Plan of Care Expiration: 9/2/2020  Visit # / Visits authorized: 6/20     Time In: 1001  Time Out: 1050  Total Billable Time: 46 minutes     Precautions: A- fib and L TKA (5 years ago)      Subjective     Pt reports: having some pain in L shoulder today. None in R. Lifted weights yesterday. Threw on Friday with no pain. Has been performing warm up before and k-tape.    He was compliant with home exercise program.  Response to previous treatment: fair  Functional change: ongoing    Pain: 2/10  Location: L shoulder      Objective     Cervical Range of Motion:     Degrees Pain   Flexion WFL No       Extension 26 deg  No      Right Rotation Min loss  no      Left Rotation Min loss no       Right Side Bending 31 deg pulling    Left Side Bending 40 deg  pulling       Upper Extremity Strength  (R) UE   (L) UE     Shoulder flexion: 4/5 Shoulder flexion: 4/5   Shoulder Abduction: 4/5 Shoulder abduction: 4/5   Shoulder ER 4/5 Shoulder ER 4/5   Shoulder IR 4/5 Shoulder IR 4/5   Lower Trap 4/5 Lower Trap 4/5   Middle Trap 4/5 Middle Trap 4/5   Rhomboids 4/5 Rhomboids 4/5       Emiliano received therapeutic exercises to develop strength, endurance, ROM, flexibility and posture for 40 minutes including:    UBE 3/3   matrix upright row 55# 2x15  Seated IR 90* RTB 2x12 ea  "  Seated ER 90* RTB 2x12 ea   Seated ER 90* c/ ecc control 4# 2x15 ea  Prone shoulder I's x15 2# c/ 3" holds  Prone shoulder T's x15 2# c/ 3" holds  Prone shoulder Y's x15 1# c/ 3" holds      Emiliano received the following manual therapy techniques:  were applied to the: B scapular mobs for 9 minutes, including:    Kinesiotape applied to B shoulders from anterior humeral head to inferior border of scapula to improve posture and reduce rounded shoulders.   Patient was screened for use of kinesiotape and was cleared for use.   1. Has the patient ever had a reaction to the adhesive in bandaids? no  2. Has the patient ever uses athletic/kinesiotape in the past?  yes  3. Is the patient hemodynamically impaired (PE, DVT, CHF, Kidney failure)? no  4. Can the PT apply the tape to your skin? yes        Home Exercises Provided and Patient Education Provided     Education provided:   - Cont HEP  - new warmup HEP before throwing  -k-tape technique    Written Home Exercises Provided: yes.  Exercises were reviewed and Emiliano was able to demonstrate them prior to the end of the session.  Emiliano demonstrated good  understanding of the education provided.     See EMR under Patient Instructions for exercises provided 7/1/2020.    Assessment     Pt tolerated session well today. Continues with rounded shoulders, increased thoracic kyphosis, and poor rotator cuff strength. Demonstrated kinesiotaping to improve posture and rounded shoulders to pt today. Pt with reduced pain throughout treatment session compared to previous. Pt met all but one STG on reassessment. Still demonstrates decreased strength in shoulder and rotator cuff musculature. Pt remains very active outside of PT especially with resistance training and overhead throwing. Continue to focus treatment on strengthening shoulders for overhead throwing.      Emiliano is progressing well towards his goals.   Pt prognosis is Good.     Pt will continue to benefit from skilled " outpatient physical therapy to address the deficits listed in the problem list box on initial evaluation, provide pt/family education and to maximize pt's level of independence in the home and community environment.     Pt's spiritual, cultural and educational needs considered and pt agreeable to plan of care and goals.     Anticipated barriers to physical therapy: none    GOALS: Short Term Goals: 4 weeks   1.Report decreased in pain at worse less than  <   / =  5  /10  to increase tolerance for functional mobility. MET 7/15/2020  2. Pt to improve Cervical and shoulder range of motion by 10% to allow for improved functional mobility to allow for improvement in IADLs. MET 7/15/2020  3. Increased B shoulder  MMT 1/2 grade to increase tolerance for ADL and work activities. Not met; gross shoulder MMT not improved   4. Pt to report be conscious of impaired sitting and standing posture daily to decrease pain. MET 7/15/2020  5. Pt to tolerate HEP to improve ROM and independence with ADL's. MET 7/15/2020     Long Term Goals: 8 weeks  1.Report decreased in pain at worse less than  <   / =  2 /10  to increase tolerance for functional mobility  2.  Patient will demonstrate improved overall function per FOTO Survey to CJ = at least 20% but < 40% impaired, limited or restricted score or less.  3.Increased B Shoulders  MMT 1 grade to increase tolerance for ADL and work activities.  4. Pt to report and demonstrate proper posture in standing and sitting to decrease pain  5. Pt to be Independent with HEP to improve ROM and independence with ADL's.  6. Pt to improve Cervical range of motion by 75% to allow for improved functional mobility to allow for improvement in IADLs.       Plan     Outpatient Physical Therapy 2 times weekly for 12 weeks to include the following interventions: Manual Therapy, Moist Heat/ Ice, Neuromuscular Re-ed, Patient Education, Therapeutic Activites and Therapeutic Exercise.     Assess response to dry  needling. Continue as appropriate. Progress with periscapular strengthening.       Freddy Banerjee, PT, DPT  7/15/2020

## 2020-07-22 ENCOUNTER — CLINICAL SUPPORT (OUTPATIENT)
Dept: REHABILITATION | Facility: HOSPITAL | Age: 75
End: 2020-07-22
Payer: MEDICARE

## 2020-07-22 DIAGNOSIS — M25.512 CHRONIC PAIN OF BOTH SHOULDERS: ICD-10-CM

## 2020-07-22 DIAGNOSIS — R29.3 POSTURE IMBALANCE: ICD-10-CM

## 2020-07-22 DIAGNOSIS — M25.511 CHRONIC PAIN OF BOTH SHOULDERS: ICD-10-CM

## 2020-07-22 DIAGNOSIS — R29.898 WEAKNESS OF SHOULDER: ICD-10-CM

## 2020-07-22 DIAGNOSIS — M54.2 CERVICAL PAIN (NECK): ICD-10-CM

## 2020-07-22 DIAGNOSIS — G89.29 CHRONIC PAIN OF BOTH SHOULDERS: ICD-10-CM

## 2020-07-22 PROCEDURE — 97110 THERAPEUTIC EXERCISES: CPT | Mod: PN

## 2020-07-22 PROCEDURE — 97140 MANUAL THERAPY 1/> REGIONS: CPT | Mod: PN

## 2020-07-22 NOTE — PROGRESS NOTES
"      Physical Therapy Daily Treatment Note     Name: Emiliano Rodas [facility]  Clinic Number: [id number]    Therapy Diagnosis:   Encounter Diagnoses   Name Primary?    Cervical pain (neck)     Chronic pain of both shoulders     Weakness of shoulder     Posture imbalance       Physician: Haja Ivey MD    Visit Date: 7/22/2020    Physician Orders: PT Eval and Treat   Medical Diagnosis from Referral:   M75.22 (ICD-10-CM) - Bicipital tendinitis of left shoulder   M75.120 (ICD-10-CM) - Complete tear of rotator cuff   M25.611 (ICD-10-CM) - Decreased range of motion of right shoulder         Evaluation Date: 6/2/2020  Authorization Period Expiration: 6/2/2021  Plan of Care Expiration: 9/2/2020  Visit # / Visits authorized: 7/20     Time In: 828  Time Out: 918  Total Billable Time: 46 minutes     Precautions: A- fib and L TKA (5 years ago)      Subjective     Pt reports: less pain overall compared to previous visit. Has been taping when he goes to gym and with throwing. Some skin irritation.     He was compliant with home exercise program.  Response to previous treatment: fair  Functional change: ongoing    Pain: 2/10  Location: L shoulder      Objective       Emiliano received therapeutic exercises to develop strength, endurance, ROM, flexibility and posture for 41 minutes including:    UBE 3/3   matrix upright row 55# 2x15  +standing W's YTB 2x12  +straight arm lat pulldown GTB 2x20  +rhythmic throws against wall @90* abd c/ 500g ball 2x30" ea  Seated IR 90* RTB 2x12 ea   Seated ER 90* RTB 2x12 ea   Seated ER 90* c/ ecc control 4# 2x15 ea  Prone shoulder I's x15 2# c/ 3" holds  Prone shoulder T's x15 2# c/ 3" holds  Prone shoulder Y's x15 1# c/ 3" holds        Emiliano received the following manual therapy techniques:  were applied to the: B scapula for 9 minutes, including:    Kinesiotape applied to B shoulders from anterior humeral head to inferior border of scapula to improve posture and reduce rounded shoulders. "   Patient was screened for use of kinesiotape and was cleared for use.   1. Has the patient ever had a reaction to the adhesive in bandaids? no  2. Has the patient ever uses athletic/kinesiotape in the past?  yes  3. Is the patient hemodynamically impaired (PE, DVT, CHF, Kidney failure)? no  4. Can the PT apply the tape to your skin? yes        Home Exercises Provided and Patient Education Provided     Education provided:   - Cont HEP  - new warmup HEP before throwing  -k-tape technique    Written Home Exercises Provided: yes.  Exercises were reviewed and Emiliano was able to demonstrate them prior to the end of the session.  Emiliano demonstrated good  understanding of the education provided.     See EMR under Patient Instructions for exercises provided 7/1/2020.    Assessment     Pt tolerated session well today. Continues with rounded shoulders, increased thoracic kyphosis, and poor rotator cuff strength. Has minimal redness where k-tap was applied by wife. Pt with improved symptoms with posture correction with k-tape. Pt with no pain in R shoulder throughout treatment session. Minimal pain to L lateral shoulder after all exercises. Pt remains very active outside of PT especially with resistance training and overhead throwing. Continue to focus treatment on strengthening shoulders for overhead throwing.      Emiliano is progressing well towards his goals.   Pt prognosis is Good.     Pt will continue to benefit from skilled outpatient physical therapy to address the deficits listed in the problem list box on initial evaluation, provide pt/family education and to maximize pt's level of independence in the home and community environment.     Pt's spiritual, cultural and educational needs considered and pt agreeable to plan of care and goals.     Anticipated barriers to physical therapy: none    GOALS: Short Term Goals: 4 weeks   1.Report decreased in pain at worse less than  <   / =  5  /10  to increase tolerance for  functional mobility. MET 7/15/2020  2. Pt to improve Cervical and shoulder range of motion by 10% to allow for improved functional mobility to allow for improvement in IADLs. MET 7/15/2020  3. Increased B shoulder  MMT 1/2 grade to increase tolerance for ADL and work activities. Not met; gross shoulder MMT not improved   4. Pt to report be conscious of impaired sitting and standing posture daily to decrease pain. MET 7/15/2020  5. Pt to tolerate HEP to improve ROM and independence with ADL's. MET 7/15/2020     Long Term Goals: 8 weeks  1.Report decreased in pain at worse less than  <   / =  2 /10  to increase tolerance for functional mobility - progressing  2.  Patient will demonstrate improved overall function per FOTO Survey to CJ = at least 20% but < 40% impaired, limited or restricted score or less. - progressing  3.Increased B Shoulders  MMT 1 grade to increase tolerance for ADL and work activities. - progressing  4. Pt to report and demonstrate proper posture in standing and sitting to decrease pain - progressing  5. Pt to be Independent with HEP to improve ROM and independence with ADL's. - progressing  6. Pt to improve Cervical range of motion by 75% to allow for improved functional mobility to allow for improvement in IADLs.  - progressing      Plan     Plan of Care Expiration: 9/2/2020    Outpatient Physical Therapy 2 times weekly for 12 weeks to include the following interventions: Manual Therapy, Moist Heat/ Ice, Neuromuscular Re-ed, Patient Education, Therapeutic Activites and Therapeutic Exercise.   Continue as appropriate. Progress with periscapular strengthening.       Freddy Banerjee, PT, DPT  7/22/2020

## 2020-07-24 ENCOUNTER — CLINICAL SUPPORT (OUTPATIENT)
Dept: REHABILITATION | Facility: HOSPITAL | Age: 75
End: 2020-07-24
Payer: MEDICARE

## 2020-07-24 DIAGNOSIS — M25.511 CHRONIC PAIN OF BOTH SHOULDERS: ICD-10-CM

## 2020-07-24 DIAGNOSIS — M25.512 CHRONIC PAIN OF BOTH SHOULDERS: ICD-10-CM

## 2020-07-24 DIAGNOSIS — M54.2 CERVICAL PAIN (NECK): ICD-10-CM

## 2020-07-24 DIAGNOSIS — G89.29 CHRONIC PAIN OF BOTH SHOULDERS: ICD-10-CM

## 2020-07-24 DIAGNOSIS — R29.3 POSTURE IMBALANCE: ICD-10-CM

## 2020-07-24 DIAGNOSIS — R29.898 WEAKNESS OF SHOULDER: ICD-10-CM

## 2020-07-24 PROCEDURE — 97140 MANUAL THERAPY 1/> REGIONS: CPT | Mod: PN

## 2020-07-24 PROCEDURE — 97110 THERAPEUTIC EXERCISES: CPT | Mod: PN

## 2020-07-24 NOTE — PROGRESS NOTES
"        Physical Therapy Daily Treatment Note     Name: Emiliano Rodas [facility]  Clinic Number: [id number]    Therapy Diagnosis:   Encounter Diagnoses   Name Primary?    Cervical pain (neck)     Chronic pain of both shoulders     Weakness of shoulder     Posture imbalance       Physician: Haja Ivey MD    Visit Date: 7/24/2020    Physician Orders: PT Eval and Treat   Medical Diagnosis from Referral:   M75.22 (ICD-10-CM) - Bicipital tendinitis of left shoulder   M75.120 (ICD-10-CM) - Complete tear of rotator cuff   M25.611 (ICD-10-CM) - Decreased range of motion of right shoulder         Evaluation Date: 6/2/2020  Authorization Period Expiration: 6/2/2021  Plan of Care Expiration: 9/2/2020  Visit # / Visits authorized: 7/20     Time In: 828  Time Out: 918  Total Billable Time: 46 minutes     Precautions: A- fib and L TKA (5 years ago)      Subjective     Pt reports: less pain overall compared to previous visit. Has been taping when he goes to gym and with throwing. Some skin irritation.     He was compliant with home exercise program.  Response to previous treatment: fair  Functional change: ongoing    Pain: 2/10  Location: L shoulder      Objective       Emiliano received therapeutic exercises to develop strength, endurance, ROM, flexibility and posture for 41 minutes including:    UBE 3/3   matrix upright row 55# 2x15  +standing W's YTB 2x12  +straight arm lat pulldown GTB 2x20  +rhythmic throws against wall @90* abd c/ 500g ball 2x30" ea  Seated IR 90* RTB 2x12 ea   Seated ER 90* RTB 2x12 ea   Seated ER 90* c/ ecc control 4# 2x15 ea  Prone shoulder I's x15 2# c/ 3" holds  Prone shoulder T's x15 2# c/ 3" holds  Prone shoulder Y's x15 1# c/ 3" holds        Emiliano received the following manual therapy techniques:  were applied to the: B scapula for 9 minutes, including:    Kinesiotape applied to B shoulders from anterior humeral head to inferior border of scapula to improve posture and reduce rounded " shoulders.   Patient was screened for use of kinesiotape and was cleared for use.   1. Has the patient ever had a reaction to the adhesive in bandaids? no  2. Has the patient ever uses athletic/kinesiotape in the past?  yes  3. Is the patient hemodynamically impaired (PE, DVT, CHF, Kidney failure)? no  4. Can the PT apply the tape to your skin? yes        Home Exercises Provided and Patient Education Provided     Education provided:   - Cont HEP  - new warmup HEP before throwing  -k-tape technique    Written Home Exercises Provided: yes.  Exercises were reviewed and Emiliano was able to demonstrate them prior to the end of the session.  Emiliano demonstrated good  understanding of the education provided.     See EMR under Patient Instructions for exercises provided 7/1/2020.    Assessment     Pt tolerated session well today. Continues with rounded shoulders, increased thoracic kyphosis, and poor rotator cuff strength. Has minimal redness where k-tap was applied by wife. Pt with improved symptoms with posture correction with k-tape. Pt with no pain in R shoulder throughout treatment session. Minimal pain to L lateral shoulder after all exercises. Pt remains very active outside of PT especially with resistance training and overhead throwing. Continue to focus treatment on strengthening shoulders for overhead throwing.      Emiliano is progressing well towards his goals.   Pt prognosis is Good.     Pt will continue to benefit from skilled outpatient physical therapy to address the deficits listed in the problem list box on initial evaluation, provide pt/family education and to maximize pt's level of independence in the home and community environment.     Pt's spiritual, cultural and educational needs considered and pt agreeable to plan of care and goals.     Anticipated barriers to physical therapy: none    GOALS: Short Term Goals: 4 weeks   1.Report decreased in pain at worse less than  <   / =  5  /10  to increase  tolerance for functional mobility. MET 7/15/2020  2. Pt to improve Cervical and shoulder range of motion by 10% to allow for improved functional mobility to allow for improvement in IADLs. MET 7/15/2020  3. Increased B shoulder  MMT 1/2 grade to increase tolerance for ADL and work activities. Not met; gross shoulder MMT not improved   4. Pt to report be conscious of impaired sitting and standing posture daily to decrease pain. MET 7/15/2020  5. Pt to tolerate HEP to improve ROM and independence with ADL's. MET 7/15/2020     Long Term Goals: 8 weeks  1.Report decreased in pain at worse less than  <   / =  2 /10  to increase tolerance for functional mobility - progressing  2.  Patient will demonstrate improved overall function per FOTO Survey to CJ = at least 20% but < 40% impaired, limited or restricted score or less. - progressing  3.Increased B Shoulders  MMT 1 grade to increase tolerance for ADL and work activities. - progressing  4. Pt to report and demonstrate proper posture in standing and sitting to decrease pain - progressing  5. Pt to be Independent with HEP to improve ROM and independence with ADL's. - progressing  6. Pt to improve Cervical range of motion by 75% to allow for improved functional mobility to allow for improvement in IADLs.  - progressing      Plan     Plan of Care Expiration: 9/2/2020    Outpatient Physical Therapy 2 times weekly for 12 weeks to include the following interventions: Manual Therapy, Moist Heat/ Ice, Neuromuscular Re-ed, Patient Education, Therapeutic Activites and Therapeutic Exercise.   Continue as appropriate. Progress with periscapular strengthening.       CARLI GEE, PT, DPT  7/24/2020

## 2020-07-24 NOTE — PROGRESS NOTES
"    Physical Therapy Daily Treatment Note     Name: Emiliano Rodas [facility] Number: [id number]    Therapy Diagnosis:   Encounter Diagnoses   Name Primary?    Cervical pain (neck)     Chronic pain of both shoulders     Weakness of shoulder     Posture imbalance       Physician: Haja Ivey MD    Visit Date: 7/24/2020    Physician Orders: PT Eval and Treat   Medical Diagnosis from Referral:   M75.22 (ICD-10-CM) - Bicipital tendinitis of left shoulder   M75.120 (ICD-10-CM) - Complete tear of rotator cuff   M25.611 (ICD-10-CM) - Decreased range of motion of right shoulder         Evaluation Date: 6/2/2020  Authorization Period Expiration: 6/2/2021  Plan of Care Expiration: 9/2/2020  Visit # / Visits authorized: 10/20     Time In: 1029  Time Out: 1131  Total Billable Time: 53 minutes     Precautions: A- fib and L TKA (5 years ago)      Subjective     Pt reports: not wearing tape today. Giving skin a break. Only pain in L shoulder. Feels like R shoulder pops sometimes.     He was compliant with home exercise program.  Response to previous treatment: fair  Functional change: ongoing    Pain: 2/10  Location: L shoulder      Objective       Emiliano received therapeutic exercises to develop strength, endurance, ROM, flexibility and posture for 47 minutes including:    UBE 3/3   matrix upright row 55# 3x12  standing W's YTB 2x12   straight arm lat pulldown GTB 2x20  rhythmic throws against wall @90* abd c/ 500g ball 2x30" ea  Seated IR 90* RTB 2x12 ea   Seated ER 90* RTB 2x12 ea   Seated ER 90* c/ ecc control 4# 2x15 ea  Prone shoulder I's x15 2# c/ 3" holds  Prone shoulder T's x15 2# c/ 3" holds  Prone shoulder Y's x15 1# c/ 3" holds        Emiliano received the following manual therapy techniques:  were applied to the: B scapula for 15 minutes, including:    Patient provided written and verbal consent to receive functional dry needling at today's visit (see consent form scanned into chart). Pt cleared of " contraindications and verbal and written consent acquired. Pt given option of copy of consent form. Pt educated on benefits and potential side effects of DN.   FDN performed to L infraspinatus, L supraspinatus tendon, L deltoid using unilateral twisting technique with pt seated. FDN performed to reduce pain and muscle tension, promote blood flow, and improve ROM and function x 10 minutes. Pt reports increased tenderness and localized pain with twisting. Pt was educated on what to expect following the procedure and he verbalized understanding. Dry needling performed by Freddy Banerjee, PT, DPT.         Home Exercises Provided and Patient Education Provided     Education provided:   - Cont HEP  - new warmup HEP before throwing  -k-tape technique    Written Home Exercises Provided: yes.  Exercises were reviewed and Emiliano was able to demonstrate them prior to the end of the session.  Emiliano demonstrated good  understanding of the education provided.     See EMR under Patient Instructions for exercises provided 7/1/2020.    Assessment     Pt tolerated session well today. Continues with rounded shoulders, increased thoracic kyphosis, and poor rotator cuff strength. No k-tape recently because of increased skin irritation. Pt with pain in L shoulder coming into session and with therex. Performed dry needling to L shoulder. See above in OBJECTIVE for details. Spoke with pt about signs of overtraining and getting adequate rest to reduce excess fatigue.      Emiliano is progressing well towards his goals.   Pt prognosis is Good.     Pt will continue to benefit from skilled outpatient physical therapy to address the deficits listed in the problem list box on initial evaluation, provide pt/family education and to maximize pt's level of independence in the home and community environment.     Pt's spiritual, cultural and educational needs considered and pt agreeable to plan of care and goals.     Anticipated barriers to physical  therapy: none    GOALS: Short Term Goals: 4 weeks   1.Report decreased in pain at worse less than  <   / =  5  /10  to increase tolerance for functional mobility. MET 7/15/2020  2. Pt to improve Cervical and shoulder range of motion by 10% to allow for improved functional mobility to allow for improvement in IADLs. MET 7/15/2020  3. Increased B shoulder  MMT 1/2 grade to increase tolerance for ADL and work activities. Not met; gross shoulder MMT not improved   4. Pt to report be conscious of impaired sitting and standing posture daily to decrease pain. MET 7/15/2020  5. Pt to tolerate HEP to improve ROM and independence with ADL's. MET 7/15/2020     Long Term Goals: 8 weeks  1.Report decreased in pain at worse less than  <   / =  2 /10  to increase tolerance for functional mobility - progressing  2.  Patient will demonstrate improved overall function per FOTO Survey to CJ = at least 20% but < 40% impaired, limited or restricted score or less. - progressing  3.Increased B Shoulders  MMT 1 grade to increase tolerance for ADL and work activities. - progressing  4. Pt to report and demonstrate proper posture in standing and sitting to decrease pain - progressing  5. Pt to be Independent with HEP to improve ROM and independence with ADL's. - progressing  6. Pt to improve Cervical range of motion by 75% to allow for improved functional mobility to allow for improvement in IADLs.  - progressing      Plan     Plan of Care Expiration: 9/2/2020    Outpatient Physical Therapy 2 times weekly for 12 weeks to include the following interventions: Manual Therapy, Moist Heat/ Ice, Neuromuscular Re-ed, Patient Education, Therapeutic Activites and Therapeutic Exercise.   Continue as appropriate. Progress with periscapular strengthening.       Freddy Banerjee, PT, DPT  7/24/2020

## 2020-07-27 ENCOUNTER — CLINICAL SUPPORT (OUTPATIENT)
Dept: REHABILITATION | Facility: HOSPITAL | Age: 75
End: 2020-07-27
Payer: MEDICARE

## 2020-07-27 DIAGNOSIS — G89.29 CHRONIC PAIN OF BOTH SHOULDERS: ICD-10-CM

## 2020-07-27 DIAGNOSIS — M25.512 CHRONIC PAIN OF BOTH SHOULDERS: ICD-10-CM

## 2020-07-27 DIAGNOSIS — R29.3 POSTURE IMBALANCE: ICD-10-CM

## 2020-07-27 DIAGNOSIS — R29.898 WEAKNESS OF SHOULDER: ICD-10-CM

## 2020-07-27 DIAGNOSIS — M25.511 CHRONIC PAIN OF BOTH SHOULDERS: ICD-10-CM

## 2020-07-27 DIAGNOSIS — M54.2 CERVICAL PAIN (NECK): ICD-10-CM

## 2020-07-27 PROCEDURE — 97110 THERAPEUTIC EXERCISES: CPT | Mod: PN

## 2020-07-27 NOTE — PROGRESS NOTES
"    Physical Therapy Daily Treatment Note     Name: Emiliano Rodas [facility]  Clinic Number: [id number]    Therapy Diagnosis:   Encounter Diagnoses   Name Primary?    Cervical pain (neck)     Chronic pain of both shoulders     Weakness of shoulder     Posture imbalance       Physician: Haja Ivey MD    Visit Date: 7/27/2020    Physician Orders: PT Eval and Treat   Medical Diagnosis from Referral:   M75.22 (ICD-10-CM) - Bicipital tendinitis of left shoulder   M75.120 (ICD-10-CM) - Complete tear of rotator cuff   M25.611 (ICD-10-CM) - Decreased range of motion of right shoulder         Evaluation Date: 6/2/2020  Authorization Period Expiration: 6/2/2021  Plan of Care Expiration: 9/2/2020  Visit # / Visits authorized: 7/20     Time In: 1430  Time Out: 1525  Total Billable Time: 53 minutes     Precautions: A- fib and L TKA (5 years ago)      Subjective     Pt reports: sore muscles today. Still has some instability with rows at home. Some pain in L shoulder with bench press and over compensation with RUE.    He was compliant with home exercise program.  Response to previous treatment: fair  Functional change: ongoing    Pain: 1/10  Location: L shoulder      Objective       Emiliano received therapeutic exercises to develop strength, endurance, ROM, flexibility and posture for 55 minutes including:    UBE 3/3   matrix upright row 55# 2x15  standing W's YTB 2x12  +face pulls YTB 2x15  straight arm lat pulldown GTB 2x20  +ER isometric walk outs GT 3x5 ea  +rhythmic stabilizations (flex/ext, horiz abd/add) supine 3x20" ea  rhythmic throws against wall @90* abd c/ 500g ball 2x30" ea  Seated IR 90* RTB 2x15 ea   Seated ER 90* RTB 2x15 ea   Seated ER 90* c/ ecc control 4# 2x15 ea  Prone shoulder I's x15 2# c/ 3" holds  Prone shoulder T's x15 2# c/ 3" holds  Prone shoulder Y's x15 1# c/ 3" holds    NV: Progress stabilization          Home Exercises Provided and Patient Education Provided     Education provided:   - Cont " HEP  - new warmup HEP before throwing  -k-tape technique    Written Home Exercises Provided: yes.  Exercises were reviewed and Emiliano was able to demonstrate them prior to the end of the session.  Emiliano demonstrated good  understanding of the education provided.     See EMR under Patient Instructions for exercises provided 7/1/2020.    Assessment     Pt tolerated session well today. Continues with rounded shoulders, increased thoracic kyphosis, and poor rotator cuff strength. Pt with reports of feeling instability in R shoulder with rows at home. Progressed by adding stabilization interventions and more posterior shoulder strengthening. Pt reports increase in pain in L shoulder with rhythmic stabilization and 90* ER/IR to 3-4/10. Reduced after end of exercise. Pt with difficulty maintaining form during isometric walkouts but no increased pain. More difficulty with L compared to R. Continue to focus treatment on strengthening shoulders for overhead throwing.      Emiliano is progressing well towards his goals.   Pt prognosis is Good.     Pt will continue to benefit from skilled outpatient physical therapy to address the deficits listed in the problem list box on initial evaluation, provide pt/family education and to maximize pt's level of independence in the home and community environment.     Pt's spiritual, cultural and educational needs considered and pt agreeable to plan of care and goals.     Anticipated barriers to physical therapy: none    GOALS: Short Term Goals: 4 weeks   1.Report decreased in pain at worse less than  <   / =  5  /10  to increase tolerance for functional mobility. MET 7/15/2020  2. Pt to improve Cervical and shoulder range of motion by 10% to allow for improved functional mobility to allow for improvement in IADLs. MET 7/15/2020  3. Increased B shoulder  MMT 1/2 grade to increase tolerance for ADL and work activities. Not met; gross shoulder MMT not improved   4. Pt to report be conscious  of impaired sitting and standing posture daily to decrease pain. MET 7/15/2020  5. Pt to tolerate HEP to improve ROM and independence with ADL's. MET 7/15/2020     Long Term Goals: 8 weeks  1.Report decreased in pain at worse less than  <   / =  2 /10  to increase tolerance for functional mobility - progressing  2.  Patient will demonstrate improved overall function per FOTO Survey to CJ = at least 20% but < 40% impaired, limited or restricted score or less. - progressing  3.Increased B Shoulders  MMT 1 grade to increase tolerance for ADL and work activities. - progressing  4. Pt to report and demonstrate proper posture in standing and sitting to decrease pain - progressing  5. Pt to be Independent with HEP to improve ROM and independence with ADL's. - progressing  6. Pt to improve Cervical range of motion by 75% to allow for improved functional mobility to allow for improvement in IADLs.  - progressing      Plan     Plan of Care Expiration: 9/2/2020    Outpatient Physical Therapy 2 times weekly for 12 weeks to include the following interventions: Manual Therapy, Moist Heat/ Ice, Neuromuscular Re-ed, Patient Education, Therapeutic Activites and Therapeutic Exercise.   Continue as appropriate. Progress with periscapular strengthening.       Freddy Banerjee, PT, DPT  7/27/2020

## 2020-07-29 ENCOUNTER — CLINICAL SUPPORT (OUTPATIENT)
Dept: REHABILITATION | Facility: HOSPITAL | Age: 75
End: 2020-07-29
Payer: MEDICARE

## 2020-07-29 DIAGNOSIS — M25.512 CHRONIC PAIN OF BOTH SHOULDERS: ICD-10-CM

## 2020-07-29 DIAGNOSIS — R29.3 POSTURE IMBALANCE: ICD-10-CM

## 2020-07-29 DIAGNOSIS — M25.511 CHRONIC PAIN OF BOTH SHOULDERS: ICD-10-CM

## 2020-07-29 DIAGNOSIS — R29.898 WEAKNESS OF SHOULDER: ICD-10-CM

## 2020-07-29 DIAGNOSIS — M54.2 CERVICAL PAIN (NECK): ICD-10-CM

## 2020-07-29 DIAGNOSIS — G89.29 CHRONIC PAIN OF BOTH SHOULDERS: ICD-10-CM

## 2020-07-29 PROCEDURE — 97110 THERAPEUTIC EXERCISES: CPT | Mod: PN

## 2020-07-29 NOTE — PROGRESS NOTES
"    Physical Therapy Daily Treatment Note     Name: Emiliano Rodas [facility]  Clinic Number: [id number]    Therapy Diagnosis:   Encounter Diagnoses   Name Primary?    Cervical pain (neck)     Chronic pain of both shoulders     Weakness of shoulder     Posture imbalance       Physician: Haja Ivey MD    Visit Date: 7/29/2020    Physician Orders: PT Eval and Treat   Medical Diagnosis from Referral:   M75.22 (ICD-10-CM) - Bicipital tendinitis of left shoulder   M75.120 (ICD-10-CM) - Complete tear of rotator cuff   M25.611 (ICD-10-CM) - Decreased range of motion of right shoulder         Evaluation Date: 6/2/2020  Authorization Period Expiration: 6/2/2021  Plan of Care Expiration: 9/2/2020  Visit # / Visits authorized: 7/20     Time In: 1302  Time Out: 1356  Total Billable Time: 54 minutes     Precautions: A- fib and L TKA (5 years ago)      Subjective     Pt reports: still having some pain in L shoulder, none in R shoulder. Difficulty with bench press at gym. R compensating for L when tired.     He was compliant with home exercise program.  Response to previous treatment: fair  Functional change: ongoing    Pain: 3/10  Location: L shoulder      Objective       Emiliano received therapeutic exercises to develop strength, endurance, ROM, flexibility and posture for 54 minutes including:    UBE 3/3   +Y's lower trap against wall 2x10  +standing thoracic rotation against wall x10 ea  +snow angels 2x10  matrix upright row 55# 2x15  standing W's YTB 2x12  face pulls YTB 2x15  straight arm lat pulldown GTB 2x20  ER isometric walk outs GTB 3x5 ea  rhythmic stabilizations (flex/ext, horiz abd/add) supine 3x20" ea  rhythmic throws against wall @90* abd c/ 500g ball 2x30" ea  Seated IR 90* RTB 2x15 ea   Seated ER 90* RTB 2x15 ea   Seated ER 90* c/ ecc control 5# 2x15 ea  Prone shoulder I's x15 2# c/ 3" holds  Prone shoulder T's x15 2# c/ 3" holds  Prone shoulder Y's x15 1# c/ 3" holds    NV: Progress stabilization; bench " press          Home Exercises Provided and Patient Education Provided     Education provided:   - Cont HEP  - new warmup HEP before throwing  -k-tape technique    Written Home Exercises Provided: yes.  Exercises were reviewed and Emiliano was able to demonstrate them prior to the end of the session.  Emiliano demonstrated good  understanding of the education provided.     See EMR under Patient Instructions for exercises provided 7/1/2020.    Assessment     Pt tolerated session well today. Continues with rounded shoulders, increased thoracic kyphosis, and poor rotator cuff strength. Continued with stabilization interventions and more posterior shoulder strengthening. Added thoracic mobility exercises through snow angels and thoracic rotation against wall. Pt reports pain with L shoulder during snow angels. None during thoracic rotation. No pain with stabilization interventions today but reduced ability LUE compared to RUE. Continue to focus treatment on strengthening shoulders for overhead throwing.      Emiliano is progressing well towards his goals.   Pt prognosis is Good.     Pt will continue to benefit from skilled outpatient physical therapy to address the deficits listed in the problem list box on initial evaluation, provide pt/family education and to maximize pt's level of independence in the home and community environment.     Pt's spiritual, cultural and educational needs considered and pt agreeable to plan of care and goals.     Anticipated barriers to physical therapy: none    GOALS: Short Term Goals: 4 weeks   1.Report decreased in pain at worse less than  <   / =  5  /10  to increase tolerance for functional mobility. MET 7/15/2020  2. Pt to improve Cervical and shoulder range of motion by 10% to allow for improved functional mobility to allow for improvement in IADLs. MET 7/15/2020  3. Increased B shoulder  MMT 1/2 grade to increase tolerance for ADL and work activities. Not met; gross shoulder MMT not  improved   4. Pt to report be conscious of impaired sitting and standing posture daily to decrease pain. MET 7/15/2020  5. Pt to tolerate HEP to improve ROM and independence with ADL's. MET 7/15/2020     Long Term Goals: 8 weeks  1.Report decreased in pain at worse less than  <   / =  2 /10  to increase tolerance for functional mobility - progressing  2.  Patient will demonstrate improved overall function per FOTO Survey to CJ = at least 20% but < 40% impaired, limited or restricted score or less. - progressing  3.Increased B Shoulders  MMT 1 grade to increase tolerance for ADL and work activities. - progressing  4. Pt to report and demonstrate proper posture in standing and sitting to decrease pain - progressing  5. Pt to be Independent with HEP to improve ROM and independence with ADL's. - progressing  6. Pt to improve Cervical range of motion by 75% to allow for improved functional mobility to allow for improvement in IADLs.  - progressing      Plan     Plan of Care Expiration: 9/2/2020    Outpatient Physical Therapy 2 times weekly for 12 weeks to include the following interventions: Manual Therapy, Moist Heat/ Ice, Neuromuscular Re-ed, Patient Education, Therapeutic Activites and Therapeutic Exercise.   Continue as appropriate. Progress with periscapular strengthening.       Freddy Banerjee, PT, DPT  7/29/2020

## 2020-08-03 ENCOUNTER — CLINICAL SUPPORT (OUTPATIENT)
Dept: REHABILITATION | Facility: HOSPITAL | Age: 75
End: 2020-08-03
Payer: MEDICARE

## 2020-08-03 DIAGNOSIS — R29.898 WEAKNESS OF SHOULDER: ICD-10-CM

## 2020-08-03 DIAGNOSIS — G89.29 CHRONIC PAIN OF BOTH SHOULDERS: ICD-10-CM

## 2020-08-03 DIAGNOSIS — M54.2 CERVICAL PAIN (NECK): ICD-10-CM

## 2020-08-03 DIAGNOSIS — M25.512 CHRONIC PAIN OF BOTH SHOULDERS: ICD-10-CM

## 2020-08-03 DIAGNOSIS — R29.3 POSTURE IMBALANCE: ICD-10-CM

## 2020-08-03 DIAGNOSIS — M25.511 CHRONIC PAIN OF BOTH SHOULDERS: ICD-10-CM

## 2020-08-03 PROCEDURE — 97140 MANUAL THERAPY 1/> REGIONS: CPT | Mod: PN

## 2020-08-03 PROCEDURE — 97110 THERAPEUTIC EXERCISES: CPT | Mod: PN

## 2020-08-03 NOTE — PROGRESS NOTES
Physical Therapy Daily Treatment Note     Name: Emiliano Rodas [facility] Number: [id number]    Therapy Diagnosis:   Encounter Diagnoses   Name Primary?    Cervical pain (neck)     Chronic pain of both shoulders     Weakness of shoulder     Posture imbalance       Physician: Haja Ivey MD    Visit Date: 8/3/2020    Physician Orders: PT Eval and Treat   Medical Diagnosis from Referral:   M75.22 (ICD-10-CM) - Bicipital tendinitis of left shoulder   M75.120 (ICD-10-CM) - Complete tear of rotator cuff   M25.611 (ICD-10-CM) - Decreased range of motion of right shoulder         Evaluation Date: 6/2/2020  Authorization Period Expiration: 8/13/2020  Plan of Care Expiration: 9/2/2020  Visit # / Visits authorized: 13/18     Time In: 958  Time Out: 1055  Total Billable Time: 47 minutes     Precautions: A- fib and L TKA (5 years ago)      Subjective     Pt reports: threw javelin, discus, and shot put this weekend. No pain with throwing. Reports some pain and weakness with bench press in L posterior shoulder.     He was compliant with home exercise program.  Response to previous treatment: fair  Functional change: ongoing    Pain: 1/10  Location: L shoulder      Objective     Emiliano received therapeutic exercises to develop strength, endurance, ROM, flexibility and posture for 20 minutes including:    UBE 3/3   matrix upright row 40# 2x30  +side lying ER 2# 2x20  Seated ER 90* c/ ecc control 5# 2x15 ea      NV: Progress stabilization; bench press    Emiliano received the following manual therapy techniques:  dry needling were applied to the: L posterior shoulder for 27 minutes, including:       Patient provided written and verbal consent to receive functional dry needling at today's visit (see consent form scanned into chart). Pt cleared of contraindications and verbal and written consent acquired. Pt given option of copy of consent form. Pt educated on benefits and potential side effects of DN.   FDN performed  to B upper traps using fanning technique for L upper traps (1 needle), L supraspinatus (1 needle), L infraspinatus (3 needles) using unilateral twisting technique with pt prone. E-Stim added to most medial and lateral needles in L infraspinatus and to middle needle in L infraspinatus and L supraspinatus at 2 Hz, 250 microseconds at intensity of ~4.0. FDN performed to reduce pain and muscle tension, promote blood flow, and improve ROM and function x 27 minutes. Pt reports familiar pain with  soreness with L upper traps. Increased familiar pain to 4/10 in L infraspinatus with needle insertion and unilateral twisting. Pt was educated on what to expect following the procedure and he verbalized understanding. Dry needling performed by Freddy Banerjee, PT, DPT.       Home Exercises Provided and Patient Education Provided     Education provided:   - Cont HEP  - new warmup HEP before throwing      Written Home Exercises Provided: yes.  Exercises were reviewed and Emiliano was able to demonstrate them prior to the end of the session.  Emiliano demonstrated good  understanding of the education provided.     See EMR under Patient Instructions for exercises provided 7/1/2020.    Assessment     Pt presents to clinic with minimal symptoms in L shoulder and none in R shoulder. No pain after throwing but weakness and some pain reported with bench press on L shoulder. Pain increased to 4-5/10 with palpation to L infraspinatus. Performed dry needling to tender area with e-stim added. See above in OBJECTIVE for detaisl about dry needling session. Performed therex after dry needling with high reps to increase muscle contractions for increased blood flow to area. Pt reports fatigue with therex but no increased symptoms.     Emiliano is progressing well towards his goals.   Pt prognosis is Good.     Pt will continue to benefit from skilled outpatient physical therapy to address the deficits listed in the problem list box on initial evaluation,  provide pt/family education and to maximize pt's level of independence in the home and community environment.     Pt's spiritual, cultural and educational needs considered and pt agreeable to plan of care and goals.     Anticipated barriers to physical therapy: none    GOALS: Short Term Goals: 4 weeks   1.Report decreased in pain at worse less than  <   / =  5  /10  to increase tolerance for functional mobility. MET 7/15/2020  2. Pt to improve Cervical and shoulder range of motion by 10% to allow for improved functional mobility to allow for improvement in IADLs. MET 7/15/2020  3. Increased B shoulder  MMT 1/2 grade to increase tolerance for ADL and work activities. Not met; gross shoulder MMT not improved   4. Pt to report be conscious of impaired sitting and standing posture daily to decrease pain. MET 7/15/2020  5. Pt to tolerate HEP to improve ROM and independence with ADL's. MET 7/15/2020     Long Term Goals: 8 weeks  1.Report decreased in pain at worse less than  <   / =  2 /10  to increase tolerance for functional mobility - progressing  2.  Patient will demonstrate improved overall function per FOTO Survey to CJ = at least 20% but < 40% impaired, limited or restricted score or less. - progressing  3.Increased B Shoulders  MMT 1 grade to increase tolerance for ADL and work activities. - progressing  4. Pt to report and demonstrate proper posture in standing and sitting to decrease pain - progressing  5. Pt to be Independent with HEP to improve ROM and independence with ADL's. - progressing  6. Pt to improve Cervical range of motion by 75% to allow for improved functional mobility to allow for improvement in IADLs.  - progressing      Plan     Plan of Care Expiration: 9/2/2020    Outpatient Physical Therapy 2 times weekly for 12 weeks to include the following interventions: Manual Therapy, Moist Heat/ Ice, Neuromuscular Re-ed, Patient Education, Therapeutic Activites and Therapeutic Exercise.   Continue as  appropriate. Progress with periscapular strengthening.       Freddy Banerjee, PT, DPT  8/3/2020

## 2020-08-05 ENCOUNTER — CLINICAL SUPPORT (OUTPATIENT)
Dept: REHABILITATION | Facility: HOSPITAL | Age: 75
End: 2020-08-05
Payer: MEDICARE

## 2020-08-05 DIAGNOSIS — G89.29 CHRONIC PAIN OF BOTH SHOULDERS: ICD-10-CM

## 2020-08-05 DIAGNOSIS — R29.898 WEAKNESS OF SHOULDER: ICD-10-CM

## 2020-08-05 DIAGNOSIS — M25.511 CHRONIC PAIN OF BOTH SHOULDERS: ICD-10-CM

## 2020-08-05 DIAGNOSIS — M25.512 CHRONIC PAIN OF BOTH SHOULDERS: ICD-10-CM

## 2020-08-05 DIAGNOSIS — M54.2 CERVICAL PAIN (NECK): ICD-10-CM

## 2020-08-05 DIAGNOSIS — R29.3 POSTURE IMBALANCE: ICD-10-CM

## 2020-08-05 PROCEDURE — 97110 THERAPEUTIC EXERCISES: CPT | Mod: PN

## 2020-08-05 NOTE — PROGRESS NOTES
Physical Therapy Daily Treatment Note     Name: Emiliano Rodas [facility] Number: [id number]    Therapy Diagnosis:   Encounter Diagnoses   Name Primary?    Cervical pain (neck)     Chronic pain of both shoulders     Weakness of shoulder     Posture imbalance       Physician: Haja Ivey MD    Visit Date: 8/5/2020    Physician Orders: PT Eval and Treat   Medical Diagnosis from Referral:   M75.22 (ICD-10-CM) - Bicipital tendinitis of left shoulder   M75.120 (ICD-10-CM) - Complete tear of rotator cuff   M25.611 (ICD-10-CM) - Decreased range of motion of right shoulder         Evaluation Date: 6/2/2020  Authorization Period Expiration: 8/13/2020  Plan of Care Expiration: 9/2/2020  Visit # / Visits authorized: 14/18     Time In: 1000  Time Out: 1102  Total Billable Time: 62 minutes     Precautions: A- fib and L TKA (5 years ago)      Subjective     Pt reports: felt better after last session with dry needling. Minimal soreness in the morning.     He was compliant with home exercise program.  Response to previous treatment: fair  Functional change: ongoing    Pain: 1/10  Location: L shoulder      Objective     Emiliano received therapeutic exercises to develop strength, endurance, ROM, flexibility and posture for 62 minutes including:    UBE 3/3   matrix upright row 50# x20; 65# 2x12  standing W's RTB 2x12  Y's lower trap against wall 2x10  standing thoracic rotation against wall x10 ea  snow angels 2x10  +bench press 45# x8, 85# x6, 115# x4  +bench press (partial reps for elbow lockout) 125# 4x2  Seated IR 90* RTB 2x15 ea   Seated ER 90* RTB 2x15 ea       Home Exercises Provided and Patient Education Provided     Education provided:   - Cont HEP  - new warmup HEP before throwing      Written Home Exercises Provided: yes.  Exercises were reviewed and Emiliano was able to demonstrate them prior to the end of the session.  Emiliano demonstrated good  understanding of the education provided.     See EMR under  Patient Instructions for exercises provided 7/1/2020.    Assessment     Pt presents to clinic with minimal symptoms in L shoulder and none in R shoulder. Reduced symptoms following dry needling last visit. Focused treatment today on bench press mechanics and weak point pt expressed in prior visits. He has most difficulty with locking out elbows at end of lift. Focused on getting pt to keep scapula set during lifts and not protracting. Pt able to perform with cueing. Reports of pain in L shoulder with heavier weight and losing scapular retraction. Discussed form awareness.     Emiliano is progressing well towards his goals.   Pt prognosis is Good.     Pt will continue to benefit from skilled outpatient physical therapy to address the deficits listed in the problem list box on initial evaluation, provide pt/family education and to maximize pt's level of independence in the home and community environment.     Pt's spiritual, cultural and educational needs considered and pt agreeable to plan of care and goals.     Anticipated barriers to physical therapy: none    GOALS: Short Term Goals: 4 weeks   1.Report decreased in pain at worse less than  <   / =  5  /10  to increase tolerance for functional mobility. MET 7/15/2020  2. Pt to improve Cervical and shoulder range of motion by 10% to allow for improved functional mobility to allow for improvement in IADLs. MET 7/15/2020  3. Increased B shoulder  MMT 1/2 grade to increase tolerance for ADL and work activities. Not met; gross shoulder MMT not improved   4. Pt to report be conscious of impaired sitting and standing posture daily to decrease pain. MET 7/15/2020  5. Pt to tolerate HEP to improve ROM and independence with ADL's. MET 7/15/2020     Long Term Goals: 8 weeks  1.Report decreased in pain at worse less than  <   / =  2 /10  to increase tolerance for functional mobility - progressing  2.  Patient will demonstrate improved overall function per FOTO Survey to CJ = at  least 20% but < 40% impaired, limited or restricted score or less. - progressing  3.Increased B Shoulders  MMT 1 grade to increase tolerance for ADL and work activities. - progressing  4. Pt to report and demonstrate proper posture in standing and sitting to decrease pain - progressing  5. Pt to be Independent with HEP to improve ROM and independence with ADL's. - progressing  6. Pt to improve Cervical range of motion by 75% to allow for improved functional mobility to allow for improvement in IADLs.  - progressing      Plan     Plan of Care Expiration: 9/2/2020    Outpatient Physical Therapy 2 times weekly for 12 weeks to include the following interventions: Manual Therapy, Moist Heat/ Ice, Neuromuscular Re-ed, Patient Education, Therapeutic Activites and Therapeutic Exercise.   Continue as appropriate. Progress with periscapular strengthening.       Freddy Banerjee, PT, DPT  8/5/2020

## 2020-08-14 ENCOUNTER — CLINICAL SUPPORT (OUTPATIENT)
Dept: REHABILITATION | Facility: HOSPITAL | Age: 75
End: 2020-08-14
Payer: MEDICARE

## 2020-08-14 DIAGNOSIS — R29.898 WEAKNESS OF SHOULDER: ICD-10-CM

## 2020-08-14 DIAGNOSIS — G89.29 CHRONIC PAIN OF BOTH SHOULDERS: ICD-10-CM

## 2020-08-14 DIAGNOSIS — M54.2 CERVICAL PAIN (NECK): ICD-10-CM

## 2020-08-14 DIAGNOSIS — R29.3 POSTURE IMBALANCE: ICD-10-CM

## 2020-08-14 DIAGNOSIS — M25.512 CHRONIC PAIN OF BOTH SHOULDERS: ICD-10-CM

## 2020-08-14 DIAGNOSIS — M25.511 CHRONIC PAIN OF BOTH SHOULDERS: ICD-10-CM

## 2020-08-14 PROCEDURE — 97110 THERAPEUTIC EXERCISES: CPT | Mod: PN

## 2020-08-14 NOTE — PROGRESS NOTES
"    Physical Therapy Daily Treatment Note     Name: Emiliano Rodas [facility]  Clinic Number: [id number]    Therapy Diagnosis:   Encounter Diagnoses   Name Primary?    Cervical pain (neck)     Chronic pain of both shoulders     Weakness of shoulder     Posture imbalance       Physician: Haja Ivey MD    Visit Date: 8/14/2020    Physician Orders: PT Eval and Treat   Medical Diagnosis from Referral:   M75.22 (ICD-10-CM) - Bicipital tendinitis of left shoulder   M75.120 (ICD-10-CM) - Complete tear of rotator cuff   M25.611 (ICD-10-CM) - Decreased range of motion of right shoulder         Evaluation Date: 6/2/2020  Authorization Period Expiration: 7/29/21  Plan of Care Expiration: 9/2/2020  Visit # / Visits authorized: 1/5     Time In: 758  Time Out: 857  Total Billable Time: 59 minutes     Precautions: A- fib and L TKA (5 years ago)      Subjective     Pt reports:  did not throw this week. Feeling better today. No pain, minimal soreness in L shoulder.     He was compliant with home exercise program.  Response to previous treatment: fair  Functional change: ongoing    Pain: 1/10  Location: L shoulder      Objective     Emiliano received therapeutic exercises to develop strength, endurance, ROM, flexibility and posture for 59 minutes including:    UBE 3/3   matrix upright row 50# x20; 65# x15; 75# x12  standing W's RTB 2x12  Y's lower trap against wall 2x15  standing thoracic rotation against wall x10 ea  snow angels 2x10  +posterior capsule stretch 3x30" L  bench press 45# x8, 85# x6, 135# x1, 145# x1, 135# x2  +bench press (partial reps for elbow lockout) 125# 4x2  +matrix chest press isometrics 2x5 (2" ramp up, 4" hold, 2" ramp down; 10" rest between reps)  Seated IR 90* RTB 2x15 ea   Seated ER 90* RTB 2x15 ea       Home Exercises Provided and Patient Education Provided     Education provided:   - Cont HEP  - new warmup HEP before throwing      Written Home Exercises Provided: yes.  Exercises were reviewed and " Emiliano was able to demonstrate them prior to the end of the session.  Emiliano demonstrated good  understanding of the education provided.     See EMR under Patient Instructions for exercises provided 7/1/2020.    Assessment     Pt presents to clinic with minimal symptoms in L shoulder and none in R shoulder. Performed bench press today with heavier weight. Pt compensates with RUE over LUE with heavy weight secondary to lack of strength in LUE. Pt reports minimal pain in L shoulder but subsides. Added chest press isometrics to improve motor recruitment for pressing motion. Pt reports difficulty but no increased pain. Cueing and instruction required to keep scapula pinned to bench to prevent excessive scapular protraction. No symptoms with all other therex.     Emiliano is progressing well towards his goals.   Pt prognosis is Good.     Pt will continue to benefit from skilled outpatient physical therapy to address the deficits listed in the problem list box on initial evaluation, provide pt/family education and to maximize pt's level of independence in the home and community environment.     Pt's spiritual, cultural and educational needs considered and pt agreeable to plan of care and goals.     Anticipated barriers to physical therapy: none    GOALS: Short Term Goals: 4 weeks   1.Report decreased in pain at worse less than  <   / =  5  /10  to increase tolerance for functional mobility. MET 7/15/2020  2. Pt to improve Cervical and shoulder range of motion by 10% to allow for improved functional mobility to allow for improvement in IADLs. MET 7/15/2020  3. Increased B shoulder  MMT 1/2 grade to increase tolerance for ADL and work activities. Not met; gross shoulder MMT not improved   4. Pt to report be conscious of impaired sitting and standing posture daily to decrease pain. MET 7/15/2020  5. Pt to tolerate HEP to improve ROM and independence with ADL's. MET 7/15/2020     Long Term Goals: 8 weeks  1.Report decreased  in pain at worse less than  <   / =  2 /10  to increase tolerance for functional mobility - progressing  2.  Patient will demonstrate improved overall function per FOTO Survey to CJ = at least 20% but < 40% impaired, limited or restricted score or less. - progressing  3.Increased B Shoulders  MMT 1 grade to increase tolerance for ADL and work activities. - progressing  4. Pt to report and demonstrate proper posture in standing and sitting to decrease pain - progressing  5. Pt to be Independent with HEP to improve ROM and independence with ADL's. - progressing  6. Pt to improve Cervical range of motion by 75% to allow for improved functional mobility to allow for improvement in IADLs.  - progressing      Plan     Plan of Care Expiration: 9/2/2020    Outpatient Physical Therapy 2 times weekly for 12 weeks to include the following interventions: Manual Therapy, Moist Heat/ Ice, Neuromuscular Re-ed, Patient Education, Therapeutic Activites and Therapeutic Exercise.   Continue as appropriate. Progress with periscapular strengthening.       Freddy Banerjee, PT, DPT  8/14/2020

## 2020-08-19 ENCOUNTER — CLINICAL SUPPORT (OUTPATIENT)
Dept: REHABILITATION | Facility: HOSPITAL | Age: 75
End: 2020-08-19
Payer: MEDICARE

## 2020-08-19 DIAGNOSIS — R29.3 POSTURE IMBALANCE: ICD-10-CM

## 2020-08-19 DIAGNOSIS — R29.898 WEAKNESS OF SHOULDER: ICD-10-CM

## 2020-08-19 DIAGNOSIS — M54.2 CERVICAL PAIN (NECK): ICD-10-CM

## 2020-08-19 DIAGNOSIS — G89.29 CHRONIC PAIN OF BOTH SHOULDERS: ICD-10-CM

## 2020-08-19 DIAGNOSIS — M25.511 CHRONIC PAIN OF BOTH SHOULDERS: ICD-10-CM

## 2020-08-19 DIAGNOSIS — M25.512 CHRONIC PAIN OF BOTH SHOULDERS: ICD-10-CM

## 2020-08-19 PROCEDURE — 97110 THERAPEUTIC EXERCISES: CPT | Mod: PN

## 2020-08-19 NOTE — PROGRESS NOTES
"    Physical Therapy Daily Treatment Note     Name: Emiliano Rodas [facility] Number: [id number]    Therapy Diagnosis:   Encounter Diagnoses   Name Primary?    Cervical pain (neck)     Chronic pain of both shoulders     Weakness of shoulder     Posture imbalance       Physician: Haja Ivey MD    Visit Date: 8/19/2020    Physician Orders: PT Eval and Treat   Medical Diagnosis from Referral:   M75.22 (ICD-10-CM) - Bicipital tendinitis of left shoulder   M75.120 (ICD-10-CM) - Complete tear of rotator cuff   M25.611 (ICD-10-CM) - Decreased range of motion of right shoulder         Evaluation Date: 6/2/2020  Authorization Period Expiration: 7/29/21  Plan of Care Expiration: 9/2/2020  Visit # / Visits authorized: 2/5     Time In: 1115  Time Out: 1209  Total Billable Time: 54 minutes     Precautions: A- fib and L TKA (5 years ago)      Subjective     Pt reports:  Threw over the weekend. N increased pain. A little pain in his L shoulder today but very minimal.     He was compliant with home exercise program.  Response to previous treatment: fair  Functional change: ongoing    Pain: 1/10  Location: L shoulder      Objective     Emiliano received therapeutic exercises to develop strength, endurance, ROM, flexibility and posture for 54 minutes including:    UBE 3/3   matrix upright row 55# x20; 70# x15; 80# x12  standing W's RTB 2x15  Y's lower trap against wall 2x15  standing thoracic rotation against wall x10 ea  +farmer's carry c/ hex bar 70#, 90#, 100# x 1 lap ea  snow angels 2x10  +posterior capsule stretch 3x30" L  bench press 45# x8, 85# x6, 135# x1, 145# x1, 135# x2  +bench press (partial reps for elbow lockout) 125# 4x2  +matrix chest press isometrics 2x5 (2" ramp up, 4" hold, 2" ramp down; 10" rest between reps)  Seated IR 90* RTB 2x15 ea   Seated ER 90* RTB 2x15 ea       Home Exercises Provided and Patient Education Provided     Education provided:   - Cont HEP  - new warmup HEP before " throwing      Written Home Exercises Provided: yes.  Exercises were reviewed and Emiliano was able to demonstrate them prior to the end of the session.  Emiliano demonstrated good  understanding of the education provided.     See EMR under Patient Instructions for exercises provided 7/1/2020.    Assessment     Pt presents to clinic with minimal symptoms in L shoulder and none in R shoulder.  Added farmer's carry for  strength and global strengthening of trunk/core and BUE strength. Required cueing to keep shoulders back to prevent rounded shoulders with B IR. Able to correct with cueing. Continued with chest press isometrics to improve motor recruitment for pressing motion. Pt reports difficulty but no increased pain. Cueing and instruction required to keep scapula pinned to bench to prevent excessive scapular protraction. No symptoms with all other therex. Deferred bench press with addition of silva's carry.     Emiliano is progressing well towards his goals.   Pt prognosis is Good.     Pt will continue to benefit from skilled outpatient physical therapy to address the deficits listed in the problem list box on initial evaluation, provide pt/family education and to maximize pt's level of independence in the home and community environment.     Pt's spiritual, cultural and educational needs considered and pt agreeable to plan of care and goals.     Anticipated barriers to physical therapy: none    GOALS: Short Term Goals: 4 weeks   1.Report decreased in pain at worse less than  <   / =  5  /10  to increase tolerance for functional mobility. MET 7/15/2020  2. Pt to improve Cervical and shoulder range of motion by 10% to allow for improved functional mobility to allow for improvement in IADLs. MET 7/15/2020  3. Increased B shoulder  MMT 1/2 grade to increase tolerance for ADL and work activities. Not met; gross shoulder MMT not improved   4. Pt to report be conscious of impaired sitting and standing posture daily to  decrease pain. MET 7/15/2020  5. Pt to tolerate HEP to improve ROM and independence with ADL's. MET 7/15/2020     Long Term Goals: 8 weeks  1.Report decreased in pain at worse less than  <   / =  2 /10  to increase tolerance for functional mobility - progressing  2.  Patient will demonstrate improved overall function per FOTO Survey to CJ = at least 20% but < 40% impaired, limited or restricted score or less. - progressing  3.Increased B Shoulders  MMT 1 grade to increase tolerance for ADL and work activities. - progressing  4. Pt to report and demonstrate proper posture in standing and sitting to decrease pain - progressing  5. Pt to be Independent with HEP to improve ROM and independence with ADL's. - progressing  6. Pt to improve Cervical range of motion by 75% to allow for improved functional mobility to allow for improvement in IADLs.  - progressing      Plan     Plan of Care Expiration: 9/2/2020    Outpatient Physical Therapy 2 times weekly for 12 weeks to include the following interventions: Manual Therapy, Moist Heat/ Ice, Neuromuscular Re-ed, Patient Education, Therapeutic Activites and Therapeutic Exercise.   Continue as appropriate. Progress with periscapular strengthening.       Freddy Banerjee, PT, DPT  8/19/2020

## 2020-08-26 ENCOUNTER — CLINICAL SUPPORT (OUTPATIENT)
Dept: REHABILITATION | Facility: HOSPITAL | Age: 75
End: 2020-08-26
Payer: MEDICARE

## 2020-08-26 DIAGNOSIS — R29.898 WEAKNESS OF SHOULDER: ICD-10-CM

## 2020-08-26 DIAGNOSIS — R29.3 POSTURE IMBALANCE: ICD-10-CM

## 2020-08-26 DIAGNOSIS — M54.2 CERVICAL PAIN (NECK): ICD-10-CM

## 2020-08-26 DIAGNOSIS — G89.29 CHRONIC PAIN OF BOTH SHOULDERS: ICD-10-CM

## 2020-08-26 DIAGNOSIS — M25.512 CHRONIC PAIN OF BOTH SHOULDERS: ICD-10-CM

## 2020-08-26 DIAGNOSIS — M25.511 CHRONIC PAIN OF BOTH SHOULDERS: ICD-10-CM

## 2020-08-26 PROCEDURE — 97110 THERAPEUTIC EXERCISES: CPT | Mod: PN

## 2020-08-26 PROCEDURE — 97140 MANUAL THERAPY 1/> REGIONS: CPT | Mod: PN

## 2020-08-26 NOTE — PLAN OF CARE
Outpatient Therapy Discharge Summary     Name: Emiliano Rodas [facility] Number: [id number]    Therapy Diagnosis:   Encounter Diagnoses   Name Primary?    Cervical pain (neck)     Chronic pain of both shoulders     Weakness of shoulder     Posture imbalance      Physician: Haja Ivey MD    Physician Orders: PT Eval and Treat   Medical Diagnosis from Referral:   M75.22 (ICD-10-CM) - Bicipital tendinitis of left shoulder   M75.120 (ICD-10-CM) - Complete tear of rotator cuff   M25.611 (ICD-10-CM) - Decreased range of motion of right shoulder         Evaluation Date: 6/2/2020        Date of Last visit: 8/26/2020  Total Visits Received: 16  Cancelled Visits: 0  No Show Visits: 0    Assessment    Goals: see daily note for 8/26/2020    Discharge reason: Patient has reached the maximum rehab potential for the present time    Plan   This patient is discharged from Physical Therapy    Freddy Banerjee, PT, DPT  8/26/2020

## 2020-08-26 NOTE — PROGRESS NOTES
"    Physical Therapy Daily Treatment Note     Name: Emiliano Rodas [facility] Number: [id number]    Therapy Diagnosis:   Encounter Diagnoses   Name Primary?    Cervical pain (neck)     Chronic pain of both shoulders     Weakness of shoulder     Posture imbalance       Physician: Haja Ivey MD    Visit Date: 8/26/2020    Physician Orders: PT Eval and Treat   Medical Diagnosis from Referral:   M75.22 (ICD-10-CM) - Bicipital tendinitis of left shoulder   M75.120 (ICD-10-CM) - Complete tear of rotator cuff   M25.611 (ICD-10-CM) - Decreased range of motion of right shoulder         Evaluation Date: 6/2/2020  Authorization Period Expiration: 7/29/21  Plan of Care Expiration: 9/2/2020  Visit # / Visits authorized: 2/5     Time In: 1111  Time Out: 1159  Total Billable Time: 48 minutes     Precautions: A- fib and L TKA (5 years ago)      Subjective     Pt reports:  Reports some increased pain in L shoulder recently after bench pressing at the gym. Pain reduced today. Does feel stronger for bench press and not compensating as much for LUE.     He was compliant with home exercise program.  Response to previous treatment: fair  Functional change: ongoing    Pain: 1/10  Location: L shoulder      Objective     Emiliano received therapeutic exercises to develop strength, endurance, ROM, flexibility and posture for 30 minutes including:    UBE 3/3   matrix upright row 40# 2x30 (after dry needling  +side lying ER 2# 2x20 ea  +prone horizontal abduction 2x15 c/ 3" holds ea  +prone scaption 2x15 c/ 3" holds ea      Emiliano received the following manual therapy techniques: dry needling were applied to the: B cervical musculature and L shoulder for 18 minutes, including:    Patient provided written and verbal consent to receive functional dry needling at today's visit (see consent form scanned into chart). Pt cleared of contraindications and verbal and written consent acquired. Pt given option of copy of consent form. Pt " educated on benefits and potential side effects of DN.   FDN performed to B upper traps, L supraspinatus, L infraspinatus using unilateral twisting technique for all musculature and fanning technique for B upper traps with pt proen. FDN performed to reduce pain and muscle tension, promote blood flow, and improve ROM and function x 18 minutes. Pt reports familiar pain with needle insertion in L infraspinatus and tenderness in B upper traps. Several localized twitch responses in B upper traps. Pt was educated on what to expect following the procedure and he verbalized understanding. Dry needling performed by Freddy Banerjee, PT, DPT.         Home Exercises Provided and Patient Education Provided     Education provided:   - Cont HEP  - new warmup HEP before throwing      Written Home Exercises Provided: yes.  Exercises were reviewed and Emiliano was able to demonstrate them prior to the end of the session.  Emiliano demonstrated good  understanding of the education provided.     See EMR under Patient Instructions for exercises provided 7/1/2020.    Assessment     Pt presents to clinic with minimal symptoms in L shoulder and none in R shoulder.  Performed dry needling with several twitch responses elicited in B upper traps and familiar pain reproduced in L infraspinatus. Reviewed warm up exercises with pt prior to lifting and throwing. Added side lying ER, prone horiz abd, and prone scaption to HEP for posterior rotator cuff strengthening. Pt able to return demonstration all exercises.   Emiliano is progressing well towards his goals.   Pt prognosis is Good.     Pt will continue to benefit from skilled outpatient physical therapy to address the deficits listed in the problem list box on initial evaluation, provide pt/family education and to maximize pt's level of independence in the home and community environment.     Pt's spiritual, cultural and educational needs considered and pt agreeable to plan of care and goals.      Anticipated barriers to physical therapy: none    GOALS: Short Term Goals: 4 weeks   1.Report decreased in pain at worse less than  <   / =  5  /10  to increase tolerance for functional mobility. MET 7/15/2020  2. Pt to improve Cervical and shoulder range of motion by 10% to allow for improved functional mobility to allow for improvement in IADLs. MET 7/15/2020  3. Increased B shoulder  MMT 1/2 grade to increase tolerance for ADL and work activities. Not met; gross shoulder MMT not improved   4. Pt to report be conscious of impaired sitting and standing posture daily to decrease pain. MET 7/15/2020  5. Pt to tolerate HEP to improve ROM and independence with ADL's. MET 7/15/2020     Long Term Goals: 8 weeks  1.Report decreased in pain at worse less than  <   / =  2 /10  to increase tolerance for functional mobility - not met; 3-4/10 at times  2.  Patient will demonstrate improved overall function per FOTO Survey to CJ = at least 20% but < 40% impaired, limited or restricted score or less. - MET 8/26/2020  3.Increased B Shoulders  MMT 1 grade to increase tolerance for ADL and work activities. - not met; lower traps, middle traps improved 1/2 grade.   4. Pt to report and demonstrate proper posture in standing and sitting to decrease pain - MET 8/26/2020  5. Pt to be Independent with HEP to improve ROM and independence with ADL's. - MET 8/26/2020  6. Pt to improve Cervical range of motion by 75% to allow for improved functional mobility to allow for improvement in IADLs.  - MET 8/26/2020      Plan     Plan of Care Expiration: 9/2/2020    Outpatient Physical Therapy 2 times weekly for 12 weeks to include the following interventions: Manual Therapy, Moist Heat/ Ice, Neuromuscular Re-ed, Patient Education, Therapeutic Activites and Therapeutic Exercise.   Continue as appropriate. Progress with periscapular strengthening.       Freddy Banerjee, PT, DPT  8/26/2020

## 2021-09-15 ENCOUNTER — CLINICAL SUPPORT (OUTPATIENT)
Dept: URGENT CARE | Facility: CLINIC | Age: 76
End: 2021-09-15
Payer: MEDICARE

## 2021-09-15 DIAGNOSIS — Z11.59 SCREENING FOR VIRAL DISEASE: Primary | ICD-10-CM

## 2021-09-15 LAB
CTP QC/QA: YES
SARS-COV-2 RDRP RESP QL NAA+PROBE: NEGATIVE

## 2021-09-15 PROCEDURE — U0002 COVID-19 LAB TEST NON-CDC: HCPCS | Mod: QW,S$GLB,, | Performed by: NURSE PRACTITIONER

## 2021-09-15 PROCEDURE — U0002: ICD-10-PCS | Mod: QW,S$GLB,, | Performed by: NURSE PRACTITIONER

## 2022-10-11 ENCOUNTER — HOSPITAL ENCOUNTER (EMERGENCY)
Facility: HOSPITAL | Age: 77
Discharge: HOME | End: 2022-10-11
Attending: EMERGENCY MEDICINE
Payer: MEDICARE

## 2022-10-11 VITALS
DIASTOLIC BLOOD PRESSURE: 101 MMHG | RESPIRATION RATE: 18 BRPM | OXYGEN SATURATION: 99 % | WEIGHT: 170 LBS | SYSTOLIC BLOOD PRESSURE: 181 MMHG | HEART RATE: 90 BPM | TEMPERATURE: 99 F | HEIGHT: 68 IN | BODY MASS INDEX: 25.76 KG/M2

## 2022-10-11 DIAGNOSIS — M10.9 GOUT, UNSPECIFIED CAUSE, UNSPECIFIED CHRONICITY, UNSPECIFIED SITE: Primary | ICD-10-CM

## 2022-10-11 DIAGNOSIS — M77.9 TENDONITIS: ICD-10-CM

## 2022-10-11 DIAGNOSIS — R52 PAIN: ICD-10-CM

## 2022-10-11 PROCEDURE — 63600175 PHARM REV CODE 636 W HCPCS: Performed by: NURSE PRACTITIONER

## 2022-10-11 PROCEDURE — 99284 EMERGENCY DEPT VISIT MOD MDM: CPT

## 2022-10-11 RX ORDER — PREDNISONE 20 MG/1
60 TABLET ORAL DAILY
Qty: 12 TABLET | Refills: 0 | Status: SHIPPED | OUTPATIENT
Start: 2022-10-11 | End: 2022-10-15

## 2022-10-11 RX ORDER — PREDNISONE 20 MG/1
60 TABLET ORAL
Status: COMPLETED | OUTPATIENT
Start: 2022-10-11 | End: 2022-10-11

## 2022-10-11 RX ORDER — HYDROCODONE BITARTRATE AND ACETAMINOPHEN 10; 325 MG/1; MG/1
1 TABLET ORAL EVERY 4 HOURS PRN
Qty: 18 TABLET | Refills: 0 | Status: SHIPPED | OUTPATIENT
Start: 2022-10-11

## 2022-10-11 RX ADMIN — PREDNISONE 60 MG: 20 TABLET ORAL at 08:10

## 2022-10-11 NOTE — ED PROVIDER NOTES
Encounter Date: 10/11/2022    SCRIBE #1 NOTE: I, Kennedi Levy, am scribing for, and in the presence of,  Lisa Richter NP.     History     Chief Complaint   Patient presents with    Wrist Injury     Endorses LT wrist pain/ swelling which he noticed Sun. AM and it is getting worse. He took Tramadol ? Dose at 0545 this am as well as Extra strength Q6. Limited ROM. Also endorses numbness to his index and middle finger.       Emiliano Small is a 77 y.o. male with a PMHx of Afib, HTN, melanoma, gout, CKD, arthritis, who presents to the ED c/o severe, constant swollen L wrist. Pt reports doing the tricep press exercise on Saturday evening, 3 days ago. Pt reports waking up the next day with the L wrist swelling with pain. Pt reports having a hx of intermittent gout for the past 15 years, but it has not bothered him recently. Pt states that the pain and swelling are unlike his usual gout symptoms. Pt has the associated symptom of feeling feverish. Pt reports not sleeping a lot in the past 48 hours secondary to pain.     Pt had no pain post work out and woke to pain and swelling       The history is provided by the patient. No  was used.   Review of patient's allergies indicates:  No Known Allergies  Past Medical History:   Diagnosis Date    Arthritis     OA    Atrial fibrillation     Cancer     melanoma x 2   & basal cell    CKD (chronic kidney disease), stage III     H/O: gout     History of cardioversion 11/2013    no reoccurrence of af    Hypertension     Inguinal hernia recurrent bilateral     Tinnitus      Past Surgical History:   Procedure Laterality Date    ablation procedure for afib      ELBOW BURSA SURGERY Left 1998    achilles tendon    EYE SURGERY Bilateral 1991    kertodomy &  torn retina (left)    FRACTURE SURGERY Left 1984    5th finger    INGUINAL HERNIA REPAIR Bilateral 03/09/2018    Laparoscopic    KNEE ARTHROSCOPY Left 2004,  2003    knee arthroscopy Right     TONSILLECTOMY       vascectomy       No family history on file.  Social History     Tobacco Use    Smoking status: Former     Types: Cigars     Quit date:      Years since quittin.8    Smokeless tobacco: Never   Substance Use Topics    Alcohol use: Yes     Alcohol/week: 3.0 - 4.0 standard drinks     Types: 3 - 4 Cans of beer per week     Review of Systems   Constitutional:  Negative for appetite change, chills, fatigue and fever.   HENT:  Negative for nosebleeds, rhinorrhea, sneezing and sore throat.    Eyes:  Negative for photophobia, pain and visual disturbance.   Respiratory:  Negative for cough, chest tightness and shortness of breath.    Cardiovascular:  Negative for chest pain, palpitations and leg swelling.   Gastrointestinal:  Negative for abdominal pain, constipation, diarrhea and nausea.   Genitourinary:  Negative for dysuria and urgency.   Musculoskeletal:  Positive for arthralgias (L wrist pain) and joint swelling (L wrist swelling). Negative for back pain, gait problem, neck pain and neck stiffness.   Skin:  Negative for color change and rash.   Neurological:  Negative for weakness, light-headedness, numbness and headaches.   Hematological:  Negative for adenopathy. Does not bruise/bleed easily.   Psychiatric/Behavioral:  Negative for confusion, decreased concentration and suicidal ideas.      Physical Exam     Initial Vitals [10/11/22 0729]   BP Pulse Resp Temp SpO2   (!) 165/82 97 18 98.4 °F (36.9 °C) 99 %      MAP       --         Physical Exam    Nursing note and vitals reviewed.  Constitutional: He appears well-developed and well-nourished.   HENT:   Head: Normocephalic.   Eyes: Conjunctivae are normal.   Neck: Neck supple.   Normal range of motion.  Musculoskeletal:         General: Tenderness and edema present.      Cervical back: Normal range of motion and neck supple.      Comments: Bilateral wrists have decreased range of motion.  Left wrist has moderate swelling, warmth and tenderness.  Patient  will range wrist with encouragement.  Pain reproduced with extension and ulnar deviation     Neurological: He is alert and oriented to person, place, and time. GCS score is 15. GCS eye subscore is 4. GCS verbal subscore is 5. GCS motor subscore is 6.   Skin: Skin is warm and dry. Capillary refill takes less than 2 seconds.   Psychiatric: He has a normal mood and affect.       ED Course   Procedures  Labs Reviewed - No data to display       Imaging Results              X-Ray Wrist Complete Left (Final result)  Result time 10/11/22 08:02:09      Final result by Yuriy Young MD (10/11/22 08:02:09)                   Impression:      Degenerative changes as above.    Two soft tissue swelling about the volar wrist, etiology of which is not known.  Infectious, inflammatory and traumatic etiologies are considerations.  Correlation regarding need for further imaging suggested.      Electronically signed by: Yuriy Young MD  Date:    10/11/2022  Time:    08:02               Narrative:    EXAMINATION:  XR WRIST COMPLETE 3 VIEWS LEFT    CLINICAL HISTORY:  Pain, unspecified    COMPARISON:  None.    FINDINGS:  The alignment is within normal limits.  Degenerative changes radioulnar joint level.  Mild narrowing radiocarpal joint.  Chondrocalcinosis.  Vascular calcifications.  Diffuse soft tissue swelling.  No displaced fractures identified.  No evidence of lytic or blastic lesions.                                       Medications   predniSONE tablet 60 mg (60 mg Oral Given 10/11/22 0818)     Medical Decision Making:   Differential Diagnosis:   Gout, tendinitis, overuse injury  ED Management:  Diagnosis management comments: This is an urgent evaluation of a 77-year-old male that presented to the ER with c/o worsening left wrist pain over the past 3 days . Pts exam was as above.     xrays were reviewed and discussed with pt. I did consider septic joint however patient will range it with encouragement.  There may be a  component of tendinitis secondary to patient's repetitive motion the day before he woke with swelling therefore I will place him in a cock-up splint.  I also treat with a 5 day burst of prednisone which would improve tendinitis and gout.  Patient has a follow-up appointment with orthopedic surgeon on Friday.  I have encouraged him to keep this appointment and to return to ER for worsening symptoms or any other concerns.    Based on exam today - I have low suspicion for medical, surgical or other life threatening condition and I believe pt is safe for discharge and outpatient f/u.    Pt verbalizes understanding of d/c instructions and will return for worsening condition.              Scribe Attestation:   Scribe #1: I performed the above scribed service and the documentation accurately describes the services I performed. I attest to the accuracy of the note.                 I, Lisa Richter NP-C  , personally performed the services described in this documentation. All medical record entries made by the scribe were at my direction and in my presence. I have reviewed the chart and agree that the record reflects my personal performance and is accurate and complete.    Clinical Impression:   Final diagnoses:  [R52] Pain  [M10.9] Gout, unspecified cause, unspecified chronicity, unspecified site (Primary)  [M77.9] Tendonitis        ED Disposition Condition    Discharge Stable          ED Prescriptions       Medication Sig Dispense Start Date End Date Auth. Provider    predniSONE (DELTASONE) 20 MG tablet Take 3 tablets (60 mg total) by mouth once daily. Start tomorrow for 4 days 12 tablet 10/11/2022 10/15/2022 Lisa Richter NP    HYDROcodone-acetaminophen (NORCO)  mg per tablet Take 1 tablet by mouth every 4 (four) hours as needed for Pain. 18 tablet 10/11/2022 -- Lisa Richter NP          Follow-up Information       Follow up With Specialties Details Why Contact Info    José Miguel Menard MD Internal  Medicine Schedule an appointment as soon as possible for a visit   [address]  [place] [address]  [phone]      [facility] Emergency Dept Emergency Medicine  If symptoms worsen or any other concerns [address]  [facility] [address] [phone]             Lisa Richter NP  10/11/22 1763

## 2022-10-11 NOTE — ED TRIAGE NOTES
Onset swelling/pain to left wrist Sunday. Left ring and middle finger decrease sensation. Pt reports decrease ROM to left fingers. + left radial pulse. Swelling noted to distal forearm to fingers. Pt states possible injury from doing tricep dips on saturday

## 2023-12-17 ENCOUNTER — HOSPITAL ENCOUNTER (EMERGENCY)
Facility: HOSPITAL | Age: 78
Discharge: HOME | End: 2023-12-17
Attending: EMERGENCY MEDICINE
Payer: MEDICARE

## 2023-12-17 VITALS
DIASTOLIC BLOOD PRESSURE: 94 MMHG | BODY MASS INDEX: 25.31 KG/M2 | HEART RATE: 72 BPM | HEIGHT: 68 IN | TEMPERATURE: 98 F | WEIGHT: 167 LBS | RESPIRATION RATE: 20 BRPM | SYSTOLIC BLOOD PRESSURE: 161 MMHG | OXYGEN SATURATION: 100 %

## 2023-12-17 DIAGNOSIS — M53.9 MULTILEVEL DEGENERATIVE DISC DISEASE: ICD-10-CM

## 2023-12-17 DIAGNOSIS — R07.9 CHEST PAIN: ICD-10-CM

## 2023-12-17 DIAGNOSIS — M54.6 BILATERAL THORACIC BACK PAIN, UNSPECIFIED CHRONICITY: Primary | ICD-10-CM

## 2023-12-17 LAB
ALBUMIN SERPL BCP-MCNC: 3.8 G/DL (ref 3.5–5.2)
ALP SERPL-CCNC: 78 U/L (ref 55–135)
ALT SERPL W/O P-5'-P-CCNC: 26 U/L (ref 10–44)
ANION GAP SERPL CALC-SCNC: 12 MMOL/L (ref 8–16)
AST SERPL-CCNC: 23 U/L (ref 10–40)
BASOPHILS # BLD AUTO: 0.06 K/UL (ref 0–0.2)
BASOPHILS NFR BLD: 0.7 % (ref 0–1.9)
BILIRUB SERPL-MCNC: 0.6 MG/DL (ref 0.1–1)
BNP SERPL-MCNC: 288 PG/ML (ref 0–99)
BUN SERPL-MCNC: 19 MG/DL (ref 8–23)
CALCIUM SERPL-MCNC: 8.7 MG/DL (ref 8.7–10.5)
CHLORIDE SERPL-SCNC: 107 MMOL/L (ref 95–110)
CO2 SERPL-SCNC: 22 MMOL/L (ref 23–29)
CREAT SERPL-MCNC: 1.1 MG/DL (ref 0.5–1.4)
DIFFERENTIAL METHOD: NORMAL
EOSINOPHIL # BLD AUTO: 0.2 K/UL (ref 0–0.5)
EOSINOPHIL NFR BLD: 2.3 % (ref 0–8)
ERYTHROCYTE [DISTWIDTH] IN BLOOD BY AUTOMATED COUNT: 13.3 % (ref 11.5–14.5)
EST. GFR  (NO RACE VARIABLE): >60 ML/MIN/1.73 M^2
GLUCOSE SERPL-MCNC: 85 MG/DL (ref 70–110)
HCT VFR BLD AUTO: 47.6 % (ref 40–54)
HGB BLD-MCNC: 15.3 G/DL (ref 14–18)
IMM GRANULOCYTES # BLD AUTO: 0.03 K/UL (ref 0–0.04)
IMM GRANULOCYTES NFR BLD AUTO: 0.4 % (ref 0–0.5)
LYMPHOCYTES # BLD AUTO: 2.1 K/UL (ref 1–4.8)
LYMPHOCYTES NFR BLD: 25.5 % (ref 18–48)
MCH RBC QN AUTO: 29.8 PG (ref 27–31)
MCHC RBC AUTO-ENTMCNC: 32.1 G/DL (ref 32–36)
MCV RBC AUTO: 93 FL (ref 82–98)
MONOCYTES # BLD AUTO: 0.8 K/UL (ref 0.3–1)
MONOCYTES NFR BLD: 9.4 % (ref 4–15)
NEUTROPHILS # BLD AUTO: 5.1 K/UL (ref 1.8–7.7)
NEUTROPHILS NFR BLD: 61.7 % (ref 38–73)
NRBC BLD-RTO: 0 /100 WBC
PLATELET # BLD AUTO: 221 K/UL (ref 150–450)
PMV BLD AUTO: 10.7 FL (ref 9.2–12.9)
POTASSIUM SERPL-SCNC: 3.8 MMOL/L (ref 3.5–5.1)
PROT SERPL-MCNC: 7.2 G/DL (ref 6–8.4)
RBC # BLD AUTO: 5.13 M/UL (ref 4.6–6.2)
SODIUM SERPL-SCNC: 141 MMOL/L (ref 136–145)
TROPONIN I SERPL DL<=0.01 NG/ML-MCNC: <0.006 NG/ML (ref 0–0.03)
WBC # BLD AUTO: 8.19 K/UL (ref 3.9–12.7)

## 2023-12-17 PROCEDURE — 84484 ASSAY OF TROPONIN QUANT: CPT

## 2023-12-17 PROCEDURE — 93010 ELECTROCARDIOGRAM REPORT: CPT | Mod: ,,, | Performed by: INTERNAL MEDICINE

## 2023-12-17 PROCEDURE — 93010 EKG 12-LEAD: ICD-10-PCS | Mod: ,,, | Performed by: INTERNAL MEDICINE

## 2023-12-17 PROCEDURE — 25000003 PHARM REV CODE 250: Performed by: EMERGENCY MEDICINE

## 2023-12-17 PROCEDURE — 93005 ELECTROCARDIOGRAM TRACING: CPT

## 2023-12-17 PROCEDURE — 83880 ASSAY OF NATRIURETIC PEPTIDE: CPT

## 2023-12-17 PROCEDURE — 80053 COMPREHEN METABOLIC PANEL: CPT

## 2023-12-17 PROCEDURE — 85025 COMPLETE CBC W/AUTO DIFF WBC: CPT

## 2023-12-17 PROCEDURE — 99285 EMERGENCY DEPT VISIT HI MDM: CPT | Mod: 25

## 2023-12-17 RX ORDER — ACETAMINOPHEN 500 MG
1000 TABLET ORAL
Status: COMPLETED | OUTPATIENT
Start: 2023-12-17 | End: 2023-12-17

## 2023-12-17 RX ORDER — ACETAMINOPHEN 500 MG
1000 TABLET ORAL EVERY 8 HOURS PRN
Qty: 30 TABLET | Refills: 0 | Status: SHIPPED | OUTPATIENT
Start: 2023-12-17

## 2023-12-17 RX ORDER — METHOCARBAMOL 500 MG/1
1000 TABLET, FILM COATED ORAL 3 TIMES DAILY PRN
Qty: 15 TABLET | Refills: 0 | Status: SHIPPED | OUTPATIENT
Start: 2023-12-17

## 2023-12-17 RX ADMIN — ACETAMINOPHEN 1000 MG: 500 TABLET ORAL at 11:12

## 2023-12-18 NOTE — DISCHARGE INSTRUCTIONS
Emergency department testing is within acceptable ranges.  Your symptoms may be related to the arthritis in your back.  Use Tylenol as needed for pain.  Use Robaxin as needed for muscle tightness or spasm.  Schedule close follow-up with your primary physician as well as your cardiologist.  Continue the remainder of your medications as you have been prescribed.  Return to the emergency department for any new, worsening or significantly concerning symptoms.    Thank you for coming to our Emergency Department today. It is important to remember that some problems are difficult to diagnose and may not be found during your first visit. Be sure to follow up with your primary care doctor and review any labs/imaging that was performed with them. If you do not have a primary care doctor, you may contact the one listed on your discharge paperwork or you may also call the [facility] Appointment Desk at [phone] to schedule an appointment with one.     All medications may potentially have side effects and it is impossible to predict which medications may give you side effects. If you feel that you are having a negative effect of any medication you should immediately stop taking them and seek medical attention.    Return to the ER with any questions/concerns, new/concerning symptoms, worsening or failure to improve. Do not drive or make any important decisions for 24 hours if you have received any pain medications, sedatives or mood altering drugs during your ER visit.

## 2023-12-18 NOTE — ED PROVIDER NOTES
Encounter Date: 2023       History     Chief Complaint   Patient presents with    Chest Pain    Back Pain     Arrives to ER with complaints of middle right side of back migrates to left side and chest. Reports pain started yesterday and was eating when it initially happened. Hx of afib, reporting pain is intermittent.      78 year old male with history of atrial fibrillation, hypertension, CKD presents to the emergency department for evaluation of chest pain and back pain.  Patient reports he began feeling right-sided upper back pain earlier this week.  Two days ago he began feeling left-sided upper back pain that worsened yesterday.  Yesterday he began feeling the left-sided back pain wrap around to the left side of the chest.  Denies diaphoresis, nausea, vomiting, worsening symptoms with exertion.  Reports that he did some weight lifting twice this week prior to symptom onset.  Reports compliance with his medications.      Review of patient's allergies indicates:  No Known Allergies  Past Medical History:   Diagnosis Date    Arthritis     OA    Atrial fibrillation     Cancer     melanoma x 2   & basal cell    CKD (chronic kidney disease), stage III     H/O: gout     History of cardioversion 2013    no reoccurrence of af    Hypertension     Inguinal hernia recurrent bilateral     Tinnitus      Past Surgical History:   Procedure Laterality Date    ablation procedure for afib      ELBOW BURSA SURGERY Left     achilles tendon    EYE SURGERY Bilateral     kertodomy &  torn retina (left)    FRACTURE SURGERY Left     5th finger    INGUINAL HERNIA REPAIR Bilateral 2018    Laparoscopic    KNEE ARTHROSCOPY Left ,      knee arthroscopy Right     TONSILLECTOMY      vascectomy       No family history on file.  Social History     Tobacco Use    Smoking status: Former     Types: Cigars     Quit date:      Years since quittin.9    Smokeless tobacco: Never   Substance Use Topics     Alcohol use: Yes     Alcohol/week: 3.0 - 4.0 standard drinks of alcohol     Types: 3 - 4 Cans of beer per week     Review of Systems   Constitutional:  Negative for fever.   HENT:  Negative for facial swelling and sore throat.    Eyes:  Negative for pain and discharge.   Respiratory:  Negative for choking and shortness of breath.    Cardiovascular:  Positive for chest pain.   Gastrointestinal:  Negative for abdominal pain, nausea and vomiting.   Genitourinary:  Negative for dysuria and frequency.   Musculoskeletal:  Positive for arthralgias and back pain.   Skin:  Negative for rash.   Neurological:  Negative for weakness and numbness.       Physical Exam     Initial Vitals [12/17/23 2109]   BP Pulse Resp Temp SpO2   (!) 193/98 85 20 97.9 °F (36.6 °C) 100 %      MAP       --         Physical Exam    Nursing note and vitals reviewed.  Constitutional: He is not diaphoretic. No distress.   HENT:   Head: Normocephalic and atraumatic.   Protecting airway   Eyes: Conjunctivae and EOM are normal. No scleral icterus.   Neck: Neck supple. No tracheal deviation present.   Normal range of motion.  Cardiovascular:  Normal rate and intact distal pulses.           Irregularly irregular rhythm   Pulmonary/Chest: No stridor. No respiratory distress.   Speaking in full sentences   Abdominal: Abdomen is soft. He exhibits no distension. There is no abdominal tenderness.   Musculoskeletal:         General: No edema.      Cervical back: Normal range of motion and neck supple.      Comments: Bilateral paraspinal thoracic and cervical/trapezius tenderness  No midline vertebral tenderness or step-off     Neurological: He is alert. He has normal strength. No cranial nerve deficit or sensory deficit.   Skin: Skin is warm and dry.   Psychiatric: He has a normal mood and affect.         ED Course   Procedures  Labs Reviewed   COMPREHENSIVE METABOLIC PANEL - Abnormal; Notable for the following components:       Result Value    CO2 22 (*)     All  "other components within normal limits   B-TYPE NATRIURETIC PEPTIDE - Abnormal; Notable for the following components:     (*)     All other components within normal limits   CBC W/ AUTO DIFFERENTIAL   TROPONIN I     EKG Readings: (Independently Interpreted)   Initial Reading: No STEMI. Rhythm: Atrial Fibrillation. Heart Rate: 84. Ectopy: No Ectopy. Conduction: Normal. ST Segments: Normal ST Segments. T Waves: Normal.       Imaging Results              X-Ray Chest PA And Lateral (Final result)  Result time 12/17/23 21:47:53      Final result by Hugo Hoover MD (12/17/23 21:47:53)                   Impression:      No acute cardiopulmonary finding.      Electronically signed by: Hugo Hoover MD  Date:    12/17/2023  Time:    21:47               Narrative:    EXAMINATION:  XR CHEST PA AND LATERAL    CLINICAL HISTORY:  Provided history is "  Chest pain, unspecified".    TECHNIQUE:  Frontal and lateral views of the chest were performed.    COMPARISON:  None.    FINDINGS:  Cardiomediastinal silhouette is not significantly enlarged.  Atherosclerotic calcifications overlie the aortic arch.  No focal consolidation.  No sizable pleural effusion.  No pneumothorax.  Degenerative changes in the thoracic spine.                                       Medications   acetaminophen tablet 1,000 mg (1,000 mg Oral Given 12/17/23 5135)     Medical Decision Making  Differential diagnosis includes but not limited to:  Musculoskeletal pain, radiculopathy, ACS, electrolyte abnormality, CHF, low clinical suspicion of PE given compliance with Eliquis anticoagulant, lack of shortness of breath, lack of hypoxia, lack of tachycardia.  Low clinical suspicion of aortic dissection given lack of acute onset, full and symmetrical distal pulses.    Patient is afebrile and in no acute distress at time history and physical.  He is hypertensive at triage.  He is to take his evening blood pressure medications.  He has full and symmetrical " distal pulses.  He has a GCS of 15.  EKG is rate controlled atrial fibrillation.  Labs without leukocytosis or significant electrolyte abnormality.  Troponin is within normal ranges.  BNP is mildly elevated but is unlikely to suggest CHF.  Chest x-ray does not have pulmonary edema.  Chest x-ray does demonstrate degenerative changes in the thoracic spine.  Patient's symptoms appear most consistent with musculoskeletal pain.  I have low clinical suspicion of ACS, PE, aortic dissection.  Patient on reassessment is resting comfortably in stretcher.  He has had improvement in his blood pressure without intervention.  He remains clinically stable.  Shared decision-making performed with patient he expresses comfort with discharge on trial of management with supportive care, analgesia, close follow-up with primary physician as well as Cardiology. counseled on supportive care, appropriate medication usage, concerning symptoms for which to return to ER and the importance of follow up. Understanding and agreement with treatment plan was expressed.     Risk  OTC drugs.  Prescription drug management.                     This chart was completed using dictation software, as a result there may be some transcription errors.                  Clinical Impression:  Final diagnoses:  [R07.9] Chest pain  [M54.6] Bilateral thoracic back pain, unspecified chronicity (Primary)  [M53.9] Multilevel degenerative disc disease          ED Disposition Condition    Discharge Stable          ED Prescriptions       Medication Sig Dispense Start Date End Date Auth. Provider    acetaminophen (TYLENOL) 500 MG tablet Take 2 tablets (1,000 mg total) by mouth every 8 (eight) hours as needed. 30 tablet 12/17/2023 -- Aura Sy MD    methocarbamoL (ROBAXIN) 500 MG Tab Take 2 tablets (1,000 mg total) by mouth 3 (three) times daily as needed (Back pain, muscle tightness). 15 tablet 12/17/2023 -- Aura Sy MD          Follow-up Information        Follow up With Specialties Details Why Contact Info    José Miguel Menard MD Internal Medicine Schedule an appointment as soon as possible for a visit   [address] [facility] [address] [phone]      Matt Lama MD Cardiology Schedule an appointment as soon as possible for a visit   [address]  [id number]  [facility] [address] [phone]               Aura Sy MD  12/17/23 1824

## 2023-12-18 NOTE — ED TRIAGE NOTES
Pt presents to ED via POV with c/o RIGHT upper back pain that started Wednesday. States the pain now radiates across back, under left arm and into LEFT chest wall. Denies SOB, dizziness, weakness, diaphoresis. States lifts weights regularly, and took a break and began back appx 5 days ago. Using IBU with some relief. Pt hypertensive upon arrival. Reports blurred vision.

## 2024-09-29 ENCOUNTER — HOSPITAL ENCOUNTER (EMERGENCY)
Facility: HOSPITAL | Age: 79
Discharge: HOME | End: 2024-09-29
Attending: EMERGENCY MEDICINE
Payer: MEDICARE

## 2024-09-29 VITALS
DIASTOLIC BLOOD PRESSURE: 74 MMHG | RESPIRATION RATE: 20 BRPM | SYSTOLIC BLOOD PRESSURE: 132 MMHG | BODY MASS INDEX: 25.01 KG/M2 | HEART RATE: 76 BPM | WEIGHT: 165 LBS | TEMPERATURE: 99 F | HEIGHT: 68 IN | OXYGEN SATURATION: 97 %

## 2024-09-29 DIAGNOSIS — K57.92 ACUTE DIVERTICULITIS: Primary | ICD-10-CM

## 2024-09-29 LAB
ALBUMIN SERPL BCP-MCNC: 3.4 G/DL (ref 3.5–5.2)
ALP SERPL-CCNC: 95 U/L (ref 55–135)
ALT SERPL W/O P-5'-P-CCNC: 28 U/L (ref 10–44)
ANION GAP SERPL CALC-SCNC: 10 MMOL/L (ref 8–16)
AST SERPL-CCNC: 22 U/L (ref 10–40)
BASOPHILS # BLD AUTO: 0.05 K/UL (ref 0–0.2)
BASOPHILS NFR BLD: 0.5 % (ref 0–1.9)
BILIRUB SERPL-MCNC: 0.8 MG/DL (ref 0.1–1)
BILIRUB UR QL STRIP: NEGATIVE
BUN SERPL-MCNC: 22 MG/DL (ref 8–23)
CALCIUM SERPL-MCNC: 9 MG/DL (ref 8.7–10.5)
CHLORIDE SERPL-SCNC: 105 MMOL/L (ref 95–110)
CLARITY UR: CLEAR
CO2 SERPL-SCNC: 24 MMOL/L (ref 23–29)
COLOR UR: YELLOW
CREAT SERPL-MCNC: 1.2 MG/DL (ref 0.5–1.4)
DIFFERENTIAL METHOD BLD: ABNORMAL
EOSINOPHIL # BLD AUTO: 0.2 K/UL (ref 0–0.5)
EOSINOPHIL NFR BLD: 1.4 % (ref 0–8)
ERYTHROCYTE [DISTWIDTH] IN BLOOD BY AUTOMATED COUNT: 13.6 % (ref 11.5–14.5)
EST. GFR  (NO RACE VARIABLE): >60 ML/MIN/1.73 M^2
GLUCOSE SERPL-MCNC: 97 MG/DL (ref 70–110)
GLUCOSE UR QL STRIP: NEGATIVE
HCT VFR BLD AUTO: 53.3 % (ref 40–54)
HGB BLD-MCNC: 16.9 G/DL (ref 14–18)
HGB UR QL STRIP: NEGATIVE
IMM GRANULOCYTES # BLD AUTO: 0.08 K/UL (ref 0–0.04)
IMM GRANULOCYTES NFR BLD AUTO: 0.8 % (ref 0–0.5)
KETONES UR QL STRIP: NEGATIVE
LEUKOCYTE ESTERASE UR QL STRIP: NEGATIVE
LIPASE SERPL-CCNC: 25 U/L (ref 4–60)
LYMPHOCYTES # BLD AUTO: 1.4 K/UL (ref 1–4.8)
LYMPHOCYTES NFR BLD: 12.9 % (ref 18–48)
MCH RBC QN AUTO: 30.3 PG (ref 27–31)
MCHC RBC AUTO-ENTMCNC: 31.7 G/DL (ref 32–36)
MCV RBC AUTO: 96 FL (ref 82–98)
MONOCYTES # BLD AUTO: 0.9 K/UL (ref 0.3–1)
MONOCYTES NFR BLD: 8.7 % (ref 4–15)
NEUTROPHILS # BLD AUTO: 8 K/UL (ref 1.8–7.7)
NEUTROPHILS NFR BLD: 75.7 % (ref 38–73)
NITRITE UR QL STRIP: NEGATIVE
NRBC BLD-RTO: 0 /100 WBC
PH UR STRIP: 7 [PH] (ref 5–8)
PLATELET # BLD AUTO: 244 K/UL (ref 150–450)
PMV BLD AUTO: 11.2 FL (ref 9.2–12.9)
POTASSIUM SERPL-SCNC: 4.3 MMOL/L (ref 3.5–5.1)
PROT SERPL-MCNC: 7.2 G/DL (ref 6–8.4)
PROT UR QL STRIP: NEGATIVE
RBC # BLD AUTO: 5.57 M/UL (ref 4.6–6.2)
SODIUM SERPL-SCNC: 139 MMOL/L (ref 136–145)
SP GR UR STRIP: 1.02 (ref 1–1.03)
URN SPEC COLLECT METH UR: NORMAL
UROBILINOGEN UR STRIP-ACNC: NEGATIVE EU/DL
WBC # BLD AUTO: 10.55 K/UL (ref 3.9–12.7)

## 2024-09-29 PROCEDURE — 96365 THER/PROPH/DIAG IV INF INIT: CPT

## 2024-09-29 PROCEDURE — 81003 URINALYSIS AUTO W/O SCOPE: CPT | Performed by: EMERGENCY MEDICINE

## 2024-09-29 PROCEDURE — 96361 HYDRATE IV INFUSION ADD-ON: CPT

## 2024-09-29 PROCEDURE — 25000003 PHARM REV CODE 250: Performed by: EMERGENCY MEDICINE

## 2024-09-29 PROCEDURE — 25500020 PHARM REV CODE 255: Performed by: EMERGENCY MEDICINE

## 2024-09-29 PROCEDURE — 63600175 PHARM REV CODE 636 W HCPCS: Performed by: EMERGENCY MEDICINE

## 2024-09-29 PROCEDURE — 80053 COMPREHEN METABOLIC PANEL: CPT | Performed by: EMERGENCY MEDICINE

## 2024-09-29 PROCEDURE — 96372 THER/PROPH/DIAG INJ SC/IM: CPT | Performed by: EMERGENCY MEDICINE

## 2024-09-29 PROCEDURE — 85025 COMPLETE CBC W/AUTO DIFF WBC: CPT | Performed by: EMERGENCY MEDICINE

## 2024-09-29 PROCEDURE — 99285 EMERGENCY DEPT VISIT HI MDM: CPT | Mod: 25

## 2024-09-29 PROCEDURE — 83690 ASSAY OF LIPASE: CPT | Performed by: EMERGENCY MEDICINE

## 2024-09-29 RX ORDER — DICYCLOMINE HYDROCHLORIDE 10 MG/ML
20 INJECTION INTRAMUSCULAR
Status: COMPLETED | OUTPATIENT
Start: 2024-09-29 | End: 2024-09-29

## 2024-09-29 RX ORDER — HYDROCODONE BITARTRATE AND ACETAMINOPHEN 5; 325 MG/1; MG/1
1 TABLET ORAL EVERY 6 HOURS PRN
Qty: 12 TABLET | Refills: 0 | Status: SHIPPED | OUTPATIENT
Start: 2024-09-29

## 2024-09-29 RX ORDER — DICYCLOMINE HYDROCHLORIDE 20 MG/1
20 TABLET ORAL EVERY 6 HOURS PRN
Qty: 20 TABLET | Refills: 0 | Status: SHIPPED | OUTPATIENT
Start: 2024-09-29 | End: 2024-10-29

## 2024-09-29 RX ORDER — CIPROFLOXACIN 500 MG/1
500 TABLET ORAL 2 TIMES DAILY
Qty: 20 TABLET | Refills: 0 | Status: SHIPPED | OUTPATIENT
Start: 2024-09-29 | End: 2024-10-09

## 2024-09-29 RX ORDER — METRONIDAZOLE 500 MG/1
500 TABLET ORAL 3 TIMES DAILY
Qty: 30 TABLET | Refills: 0 | Status: SHIPPED | OUTPATIENT
Start: 2024-09-29 | End: 2024-10-09

## 2024-09-29 RX ORDER — ONDANSETRON 4 MG/1
4 TABLET, FILM COATED ORAL EVERY 8 HOURS PRN
Qty: 12 TABLET | Refills: 0 | Status: SHIPPED | OUTPATIENT
Start: 2024-09-29

## 2024-09-29 RX ADMIN — IOHEXOL 75 ML: 350 INJECTION, SOLUTION INTRAVENOUS at 12:09

## 2024-09-29 RX ADMIN — DICYCLOMINE HYDROCHLORIDE 20 MG: 10 INJECTION, SOLUTION INTRAMUSCULAR at 10:09

## 2024-09-29 RX ADMIN — PIPERACILLIN AND TAZOBACTAM 4.5 G: 4; .5 INJECTION, POWDER, FOR SOLUTION INTRAVENOUS; PARENTERAL at 03:09

## 2024-09-29 RX ADMIN — SODIUM CHLORIDE 1000 ML: 9 INJECTION, SOLUTION INTRAVENOUS at 10:09

## 2024-09-29 NOTE — ED TRIAGE NOTES
Abdominal Pain (Pt reports to ED for Abdominal pain for 4 days. Denies N/V/D endorses constipation since Thursday but able to have a bowel movement today. )

## 2024-09-29 NOTE — ED PROVIDER NOTES
Encounter Date: 2024       History     Chief Complaint   Patient presents with    Abdominal Pain     Pt reports to ED for Abdominal pain for 4 days. Denies N/V/D endorses constipation since Thursday but able to have a bowel movement today.      HPI  This 79-year-old white male presents emergency room complaining of right-sided abdominal pain.  Pain has been present for 3 days it is dull and constant but is sharp episodically.  The patient also has some left flank pain that comes and goes.  The patient denies nausea vomiting or diarrhea.  The patient may have some mild painful urination and he has vague diffuse pain referable to his genitalia generally.  Review of patient's allergies indicates:  No Known Allergies  Past Medical History:   Diagnosis Date    Arthritis     OA    Atrial fibrillation     Cancer     melanoma x 2   & basal cell    CKD (chronic kidney disease), stage III     H/O: gout     History of cardioversion 2013    no reoccurrence of af    Hypertension     Inguinal hernia recurrent bilateral     Tinnitus      Past Surgical History:   Procedure Laterality Date    ablation procedure for afib      ELBOW BURSA SURGERY Left     achilles tendon    EYE SURGERY Bilateral     kertodomy &  torn retina (left)    FRACTURE SURGERY Left     5th finger    INGUINAL HERNIA REPAIR Bilateral 2018    Laparoscopic    KNEE ARTHROSCOPY Left ,      knee arthroscopy Right     TONSILLECTOMY      vascectomy       No family history on file.  Social History     Tobacco Use    Smoking status: Former     Types: Cigars     Quit date:      Years since quittin.7    Smokeless tobacco: Never   Substance Use Topics    Alcohol use: Yes     Alcohol/week: 3.0 - 4.0 standard drinks of alcohol     Types: 3 - 4 Cans of beer per week     Review of Systems  The patient was questioned specifically with regard to the following.  General: Fever, chills, sweats. Neuro: Headache. Eyes: eye problems. ENT: Ear  pain, sore throat. Cardiovascular: Chest pain. Respiratory: Cough, shortness of breath. Gastrointestinal: Abdominal pain, vomiting, diarrhea. Genitourinary: Painful urination.  Musculoskeletal: Arm and leg problems. Skin: Rash.  The review of systems was negative except for the following:  Right-sided abdominal pain, left flank pain, genitalia pain, some constipation.  Physical Exam     Initial Vitals [09/29/24 0923]   BP Pulse Resp Temp SpO2   (!) 200/92 93 18 98.1 °F (36.7 °C) 98 %      MAP       --         Physical Exam  The patient was examined specifically for the following:   General:No significant distress, Good color, Warm and dry. Head and neck:Scalp atraumatic, Neck supple. Neurological:Appropriate conversation, Gross motor deficits. Eyes:Conjugate gaze, Clear corneas. ENT: No epistaxis. Cardiac: Regular rate and rhythm, Grossly normal heart tones. Pulmonary: Wheezing, Rales. Gastrointestinal: Abdominal tenderness, Abdominal distention. Musculoskeletal: Extremity deformity, Apparent pain with range of motion of the joints. Skin: Rash.   The findings on examination were normal except for the following:  The patient has tenderness of the right mid abdomen.  Lungs are clear.  The heart tones are normal.  There is no guarding rebound mass or distention.  The patient's blood pressure is 200/92.  Has normal male genitalia there is no penile discharge or tenderness of the genitalia.  The patient is thin.  ED Course   Procedures  Labs Reviewed   COMPREHENSIVE METABOLIC PANEL - Abnormal       Result Value    Sodium 139      Potassium 4.3      Chloride 105      CO2 24      Glucose 97      BUN 22      Creatinine 1.2      Calcium 9.0      Total Protein 7.2      Albumin 3.4 (*)     Total Bilirubin 0.8      Alkaline Phosphatase 95      AST 22      ALT 28      eGFR >60      Anion Gap 10     CBC W/ AUTO DIFFERENTIAL - Abnormal    WBC 10.55      RBC 5.57      Hemoglobin 16.9      Hematocrit 53.3      MCV 96      MCH 30.3       MCHC 31.7 (*)     RDW 13.6      Platelets 244      MPV 11.2      Immature Granulocytes 0.8 (*)     Gran # (ANC) 8.0 (*)     Immature Grans (Abs) 0.08 (*)     Lymph # 1.4      Mono # 0.9      Eos # 0.2      Baso # 0.05      nRBC 0      Gran % 75.7 (*)     Lymph % 12.9 (*)     Mono % 8.7      Eosinophil % 1.4      Basophil % 0.5      Differential Method Automated     LIPASE    Lipase 25     URINALYSIS, REFLEX TO URINE CULTURE    Specimen UA Urine, Clean Catch      Color, UA Yellow      Appearance, UA Clear      pH, UA 7.0      Specific Gravity, UA 1.020      Protein, UA Negative      Glucose, UA Negative      Ketones, UA Negative      Bilirubin (UA) Negative      Occult Blood UA Negative      Nitrite, UA Negative      Urobilinogen, UA Negative      Leukocytes, UA Negative      Narrative:     Specimen Source->Urine     EKG Readings: (Independently Interpreted)   This patient is in atrial fibrillation with a heart rate of 82.  There are no significant ST segment or T-wave changes.  There is no definite evidence of acute myocardial infarction or malignant arrhythmia.  This compares favorably to an EKG from December 17th.  There are nonspecific T-wave changes there are low voltage QRS complexes.       Imaging Results               CT Abdomen Pelvis With IV Contrast NO Oral Contrast (Final result)  Result time 09/29/24 13:32:45      Final result by Musa Thakkar MD (09/29/24 13:32:45)                   Impression:      This report was flagged in Epic as abnormal.    1. Findings most consistent with acute diverticulitis involving the mid to distal sigmoid colon.  No definite focal organized fluid collection at this time or free air.  There is surrounding reactive edema.  Follow-up colonoscopy is recommended to exclude underlying lesion.  2. Fluid-filled distal small bowel loops, noting there is some liquid stool within the cecum.  Diarrheal illness in the setting of enteritis is a consideration noting there is some  reactive wall thickening involving a few distal small bowel loops.  Correlation is advised.  3. Findings may reflect hepatic steatosis, correlation with LFTs recommended.  4. Prostatomegaly.  5. Please see above for several additional findings.      Electronically signed by: Musa Thakkar MD  Date:    09/29/2024  Time:    13:32               Narrative:    EXAMINATION:  CT ABDOMEN PELVIS WITH IV CONTRAST    CLINICAL HISTORY:  RLQ abdominal pain (Age >= 14y);    TECHNIQUE:  Low dose axial images, sagittal and coronal reformations were obtained from the lung bases to the pubic symphysis following the IV administration of 75 mL of Omnipaque 350 .  Oral contrast was not given.    COMPARISON:  None    FINDINGS:  Images of the lower thorax are remarkable for bilateral dependent atelectasis/scarring.    The liver is hypoattenuating, likely related to contrast phase however steatosis not excluded.  Correlation with LFTs recommended.  There is a subcentimeter low attenuating lesion within the right hepatic lobe, too small for characterization.  The spleen, adrenal glands and gallbladder are grossly unremarkable.  There is atrophic change of the pancreas without pancreatic ductal dilation.  The stomach is decompressed without wall thickening.  The portal vein, splenic vein, celiac axis and SMA all are patent.  There is prominent calcification at the origin of the celiac artery and SMA.  No significant abdominal lymphadenopathy.    The kidneys enhance symmetrically without hydronephrosis or nephrolithiasis.  Subcentimeter low attenuating lesions arise from the left kidney, too small for characterization.  The bilateral ureters are unable to be followed in their entirety to the urinary bladder, no definite calculi seen or secondary findings to suggest obstructive uropathy.  The urinary bladder is relatively decompressed without wall thickening.  The prostate is enlarged.    There is a small amount of fluid in the pelvis.   There are several scattered colonic diverticula.  There is wall thickening and inflammation involving the mid to distal sigmoid colon in the region of several diverticula most consistent with acute diverticulitis.  No definite focal organized fluid collection or free air.  The appendix is unremarkable.  The terminal ileum is unremarkable.  There are a few scattered fluid-filled small bowel loops distally.  There is wall thickening involving a few distal ileal loops, may reflect reactive wall thickening from the adjacent colonic process however superimposed infectious or inflammatory ileitis not excluded.  There are a few scattered shotty periaortic, pericaval, and mesenteric lymph nodes.  There is atherosclerotic calcification of the aorta and its branches no stenosis at the origin of the bilateral renal arteries.  Surgical changes noted of the scrotum.    There is osteopenia.  There are degenerative changes of the bilateral sacroiliac joints, pubic symphysis, and spine.  There is grade 1 anterolisthesis of L5 on S1.  No significant inguinal lymphadenopathy.                                       Medications   sodium chloride 0.9% bolus 1,000 mL 1,000 mL (0 mLs Intravenous Stopped 9/29/24 1111)   dicyclomine injection 20 mg (20 mg Intramuscular Given 9/29/24 1012)   iohexoL (OMNIPAQUE 350) injection 75 mL (75 mLs Intravenous Given 9/29/24 1247)     Medical Decision Making  Given the above, this patient presents emergency with a abdominal pain and some constipation.  The patient did have a bowel movement today.  Laboratory work is unremarkable.  CT scan of the abdomen reveals acute sigmoid diverticulitis.  I will treat with Cipro Flagyl and discharge to outpatient evaluation and treatment.  I will have him return if he gets worse or if new problems develop.  There is no significant anemia on CBC there is no leukocytosis there is no evidence of hepatic or renal failure there is no evidence of cholestasis.  There is no  evidence of urinary tract infection.                                  Clinical Impression:  Final diagnoses:  [K57.92] Acute diverticulitis (Primary)          ED Disposition Condition    Discharge Stable          ED Prescriptions       Medication Sig Dispense Start Date End Date Auth. Provider    HYDROcodone-acetaminophen (NORCO) 5-325 mg per tablet Take 1 tablet by mouth every 6 (six) hours as needed. 12 tablet 9/29/2024 -- Isidoro Schreiber MD    ciprofloxacin HCl (CIPRO) 500 MG tablet Take 1 tablet (500 mg total) by mouth 2 (two) times daily. for 10 days 20 tablet 9/29/2024 10/9/2024 Isidoro Schreiber MD    metroNIDAZOLE (FLAGYL) 500 MG tablet Take 1 tablet (500 mg total) by mouth 3 (three) times daily. for 10 days 30 tablet 9/29/2024 10/9/2024 Isidoro Schreiber MD    dicyclomine (BENTYL) 20 mg tablet Take 1 tablet (20 mg total) by mouth every 6 (six) hours as needed (Abdominal pain). 20 tablet 9/29/2024 10/29/2024 Isidoro Schreiber MD    ondansetron (ZOFRAN) 4 MG tablet Take 1 tablet (4 mg total) by mouth every 8 (eight) hours as needed. 12 tablet 9/29/2024 -- Isidoro Schreiber MD          Follow-up Information       Follow up With Specialties Details Why Contact Info    Hillary Perry MD Gastroenterology In 1 week  [address] [facility] [address]  [phone]               Isidoro Schreiber MD  09/29/24 9391

## 2024-09-29 NOTE — DISCHARGE INSTRUCTIONS
Please return immediately if you get worse or if new problems develop.  Please follow up with the gastroenterologist above.  You may also see your primary care doctor.  Antibiotics as directed.  Tylenol 1000 mg by mouth every 8 hours as needed for pain or Tylenol with hydrocodone as directed.  Dicyclomine for cramping.  Zofran for nausea.  High-fiber diet.

## 2024-10-01 ENCOUNTER — TELEPHONE (OUTPATIENT)
Dept: SURGERY | Facility: CLINIC | Age: 79
End: 2024-10-01
Payer: MEDICARE

## 2024-10-01 ENCOUNTER — TELEPHONE (OUTPATIENT)
Dept: GASTROENTEROLOGY | Facility: CLINIC | Age: 79
End: 2024-10-01
Payer: MEDICARE

## 2024-10-01 LAB
OHS QRS DURATION: 84 MS
OHS QTC CALCULATION: 436 MS

## 2024-10-01 NOTE — TELEPHONE ENCOUNTER
----- Message from Destiny sent at 10/1/2024  2:11 PM CDT -----  Regarding: APPT  Contact: PT@ [phone]  Pt is calling asking asking to speak with someone in the office to schedule an appt. Pt was just recently in the ER on 9/29 and is needing to be seen soon. No available appts in Epic for the time frame pt is needing to be seen, according to their discharge paper work. Please call pt to schedule.PT@[phone] Thanks.        When was patient seen in the ER / discharged?:9/29        Additional Information: Diverticulitis

## 2024-10-04 ENCOUNTER — OFFICE VISIT (OUTPATIENT)
Facility: CLINIC | Age: 79
End: 2024-10-04
Payer: MEDICARE

## 2024-10-04 VITALS
SYSTOLIC BLOOD PRESSURE: 140 MMHG | HEART RATE: 72 BPM | BODY MASS INDEX: 25.41 KG/M2 | DIASTOLIC BLOOD PRESSURE: 72 MMHG | WEIGHT: 167.13 LBS

## 2024-10-04 DIAGNOSIS — K57.92 DIVERTICULITIS: Primary | ICD-10-CM

## 2024-10-04 PROCEDURE — 99999 PR PBB SHADOW E&M-EST. PATIENT-LVL III: CPT | Mod: PBBFAC,,, | Performed by: COLON & RECTAL SURGERY

## 2024-10-04 NOTE — PROGRESS NOTES
Subjective:       Patient ID: Emiliano Small is a 79 y.o. male.    Chief Complaint: Abdominal Pain    HPI  78 yo M seen in ED at [facility] 9/29/24 with c/o right-sided abdominal pain and left flank pain off/on for 3 days.  CT showed acute uncomplicated diverticulitis - discharged home on Cipro/Flagyl.  He comes in today for evaluation, pain is essentially resolved, has 3 days of abx left, eating well with no N/V, BM's are very loose since stating antibiotics.  No prior episodes of diverticulitis.    +Hx of Afib, on eliquis    CT A/P 9/29/24:  (Images personally reviewed.)  1. Findings most consistent with acute diverticulitis involving the mid to distal sigmoid colon.  No definite focal organized fluid collection at this time or free air.  There is surrounding reactive edema.  Follow-up colonoscopy is recommended to exclude underlying lesion.  2. Fluid-filled distal small bowel loops, noting there is some liquid stool within the cecum.  Diarrheal illness in the setting of enteritis is a consideration noting there is some reactive wall thickening involving a few distal small bowel loops.  Correlation is advised.  3. Findings may reflect hepatic steatosis, correlation with LFTs recommended.  4. Prostatomegaly.    Last colonoscopy - at least 15 yrs ago, Cologuard negative 5/2022  No family hx of CRC or IBD.    Review of patient's allergies indicates:  No Known Allergies    Past Medical History:   Diagnosis Date    Arthritis     OA    Atrial fibrillation     Cancer     melanoma x 2   & basal cell    CKD (chronic kidney disease), stage III     H/O: gout     History of cardioversion 11/2013    no reoccurrence of af    Hypertension     Inguinal hernia recurrent bilateral     Tinnitus        Past Surgical History:   Procedure Laterality Date    ablation procedure for afib      ELBOW BURSA SURGERY Left 1998    achilles tendon    EYE SURGERY Bilateral 1991    kertodomy &  torn retina (left)    FRACTURE SURGERY  Left 1984    5th finger    INGUINAL HERNIA REPAIR Bilateral 03/09/2018    Laparoscopic    KNEE ARTHROSCOPY Left 2004,  2003    knee arthroscopy Right     TONSILLECTOMY      vascectomy         Current Outpatient Medications   Medication Sig Dispense Refill    aspirin (ECOTRIN) 81 MG EC tablet Take by mouth every morning. Takes two 81 mg tablets (162 mg) on Mondays, Wednesdays, and Fridays,  Take one 81 mg tablet the other days of the week.      ciprofloxacin HCl (CIPRO) 500 MG tablet Take 1 tablet (500 mg total) by mouth 2 (two) times daily. for 10 days 20 tablet 0    ERGOCALCIFEROL, VITAMIN D2, (VITAMIN D ORAL) Take 1 tablet by mouth. Takes twice a week      fish oil-omega-3 fatty acids 300-1,000 mg capsule Take 1 capsule by mouth every morning.      magnesium 200 mg Tab Take 1 tablet by mouth every morning.      metroNIDAZOLE (FLAGYL) 500 MG tablet Take 1 tablet (500 mg total) by mouth 3 (three) times daily. for 10 days 30 tablet 0    acetaminophen (TYLENOL) 500 MG tablet Take 2 tablets (1,000 mg total) by mouth every 8 (eight) hours as needed. 30 tablet 0    dicyclomine (BENTYL) 20 mg tablet Take 1 tablet (20 mg total) by mouth every 6 (six) hours as needed (Abdominal pain). (Patient not taking: Reported on 10/4/2024) 20 tablet 0    fenofibrate 160 MG Tab Take 160 mg by mouth after dinner.       HYDROcodone-acetaminophen (NORCO) 5-325 mg per tablet Take 1 tablet by mouth every 6 (six) hours as needed. 12 tablet 0    methocarbamoL (ROBAXIN) 500 MG Tab Take 2 tablets (1,000 mg total) by mouth 3 (three) times daily as needed (Back pain, muscle tightness). (Patient not taking: Reported on 10/4/2024) 15 tablet 0    ondansetron (ZOFRAN) 4 MG tablet Take 1 tablet (4 mg total) by mouth every 8 (eight) hours as needed. (Patient not taking: Reported on 10/4/2024) 12 tablet 0    ondansetron (ZOFRAN-ODT) 8 MG TbDL Take 1 tablet (8 mg total) by mouth every 8 (eight) hours as needed (nausea). (Patient not taking: Reported on  10/4/2024) 30 tablet 1    polyethylene glycol (GLYCOLAX) 17 gram/dose powder Take 17 g by mouth once daily. 510 g 3    UNABLE TO FIND Take 1 tablet by mouth 3 (three) times daily. Take Lipoflavanoid 2-3 times per pain for Tinnitus (Patient not taking: Reported on 10/4/2024)      valsartan (DIOVAN) 80 MG tablet Take 1 tablet (80 mg total) by mouth 2 (two) times daily. 14 tablet 0     No current facility-administered medications for this visit.       No family history on file.    Social History     Socioeconomic History    Marital status:    Tobacco Use    Smoking status: Former     Types: Cigars     Quit date:      Years since quittin.7    Smokeless tobacco: Never   Substance and Sexual Activity    Alcohol use: Yes     Alcohol/week: 3.0 - 4.0 standard drinks of alcohol     Types: 3 - 4 Cans of beer per week     Social Drivers of Health     Financial Resource Strain: Unknown (2022)    Received from [employer] [facility] [employer]    Overall Financial Resource Strain (CARDIA)     Difficulty of Paying Living Expenses: Patient declined   Food Insecurity: Unknown (2022)    Received from [employer] [facility]    Hunger Vital Sign     Worried About Running Out of Food in the Last Year: Patient declined     Ran Out of Food in the Last Year: Patient declined   Transportation Needs: Unknown (2022)    Received from [facility] [employer] [facility] [employer]    PRAPARE - Transportation     Lack of Transportation (Medical): Patient declined     Lack of Transportation (Non-Medical): Patient declined   Physical Activity: Sufficiently Active (2022)    Received from [facility] [employer] [facility]    Exercise Vital Sign     Days of Exercise per Week: 3 days     Minutes of Exercise per Session: 60 min   Stress: No Stress Concern Present (2022)    Received from [employer] [facility]    [employer] [place] [employer] - Occupational Stress Questionnaire     Feeling of Stress : Only a little   Housing Stability:  Unknown (5/16/2022)    [facility]    Housing Stability Vital Sign     Unable to Pay for Housing in the Last Year: Patient refused     Unstable Housing in the Last Year: Patient refused       Review of Systems   Constitutional:  Negative for chills and fever.   HENT:  Negative for congestion and sinus pressure.    Eyes:  Negative for visual disturbance.   Respiratory:  Negative for cough and shortness of breath.    Cardiovascular:  Negative for chest pain and palpitations.   Gastrointestinal:  Negative for abdominal distention, abdominal pain, anal bleeding, blood in stool, constipation, diarrhea, nausea, rectal pain and vomiting.   Endocrine: Negative for cold intolerance and heat intolerance.   Genitourinary:  Negative for dysuria and frequency.   Musculoskeletal:  Negative for arthralgias and back pain.   Skin:  Negative for rash.   Allergic/Immunologic: Negative for immunocompromised state.   Neurological:  Negative for dizziness, light-headedness and headaches.   Psychiatric/Behavioral:  Negative for confusion. The patient is not nervous/anxious.        Objective:      Physical Exam  Vitals reviewed. Exam conducted with a chaperone present.   Constitutional:       Appearance: He is well-developed.   HENT:      Head: Normocephalic and atraumatic.   Eyes:      Conjunctiva/sclera: Conjunctivae normal.   Pulmonary:      Effort: Pulmonary effort is normal. No respiratory distress.   Abdominal:      General: Bowel sounds are normal. There is no distension.      Palpations: Abdomen is soft. There is no mass.      Tenderness: There is no abdominal tenderness. There is no guarding or rebound.   Musculoskeletal:      Cervical back: Normal range of motion and neck supple.   Skin:     General: Skin is warm and dry.      Findings: No erythema.   Neurological:      Mental Status: He is alert and oriented to person, place, and time.           Lab Results   Component Value Date    WBC 10.55  09/29/2024    HGB 16.9 09/29/2024    HCT 53.3 09/29/2024    MCV 96 09/29/2024     09/29/2024     BMP  Lab Results   Component Value Date     09/29/2024    K 4.3 09/29/2024     09/29/2024    CO2 24 09/29/2024    BUN 22 09/29/2024    CREATININE 1.2 09/29/2024    CALCIUM 9.0 09/29/2024    ANIONGAP 10 09/29/2024    ESTGFRAFRICA 73 (L) 01/26/2022    EGFRNONAA >60.0 03/06/2018     CMP  Sodium   Date Value Ref Range Status   09/29/2024 139 136 - 145 mmol/L Final     Potassium   Date Value Ref Range Status   09/29/2024 4.3 3.5 - 5.1 mmol/L Final     Chloride   Date Value Ref Range Status   09/29/2024 105 95 - 110 mmol/L Final     CO2   Date Value Ref Range Status   09/29/2024 24 23 - 29 mmol/L Final     Glucose   Date Value Ref Range Status   09/29/2024 97 70 - 110 mg/dL Final     BUN   Date Value Ref Range Status   09/29/2024 22 8 - 23 mg/dL Final     Creatinine   Date Value Ref Range Status   09/29/2024 1.2 0.5 - 1.4 mg/dL Final     Calcium   Date Value Ref Range Status   09/29/2024 9.0 8.7 - 10.5 mg/dL Final     Total Protein   Date Value Ref Range Status   09/29/2024 7.2 6.0 - 8.4 g/dL Final     Albumin   Date Value Ref Range Status   09/29/2024 3.4 (L) 3.5 - 5.2 g/dL Final     Total Bilirubin   Date Value Ref Range Status   09/29/2024 0.8 0.1 - 1.0 mg/dL Final     Comment:     For infants and newborns, interpretation of results should be based  on gestational age, weight and in agreement with clinical  observations.    Premature Infant recommended reference ranges:  Up to 24 hours.............<8.0 mg/dL  Up to 48 hours............<12.0 mg/dL  3-5 days..................<15.0 mg/dL  6-29 days.................<15.0 mg/dL       Alkaline Phosphatase   Date Value Ref Range Status   09/29/2024 95 55 - 135 U/L Final     AST   Date Value Ref Range Status   09/29/2024 22 10 - 40 U/L Final     ALT   Date Value Ref Range Status   09/29/2024 28 10 - 44 U/L Final     Anion Gap   Date Value Ref Range Status  "  09/29/2024 10 8 - 16 mmol/L Final     eGFR if    Date Value Ref Range Status   03/06/2018 >60.0 >60 mL/min/1.73 m^2 Final     eGFR    Date Value Ref Range Status   01/26/2022 73 (L) >89 mL/min Final     eGFR if non    Date Value Ref Range Status   03/06/2018 >60.0 >60 mL/min/1.73 m^2 Final     Comment:     Calculation used to obtain the estimated glomerular filtration  rate (eGFR) is the CKD-EPI equation.        No results found for: "CEA"  No results found for: "CRP"        Assessment:       1. Diverticulitis    Uncomplicated sigmoid diverticulitis, 1st episode, improved with oral abx    Plan:   Discussed pathophysiology of diverticulitis and natural history, risk factors.  Complete course of abx  Low fiber diet, transition to high fiber in a week or so  Probiotics for antibiotic-associated diarrhea  Will schedule for colonoscopy in 6-8 weeks    Dami Resendiz MD, FACS, FASCRS  , Department of Colon & Rectal Surgery     This note was created using voice recognition software, and may contain some unrecognized transcriptional errors.     "

## 2024-10-07 ENCOUNTER — TELEPHONE (OUTPATIENT)
Dept: ENDOSCOPY | Facility: HOSPITAL | Age: 79
End: 2024-10-07
Payer: MEDICARE

## 2024-10-07 DIAGNOSIS — K57.32 DIVERTICULITIS OF COLON: ICD-10-CM

## 2024-10-07 DIAGNOSIS — K57.92 DIVERTICULITIS: ICD-10-CM

## 2024-10-07 DIAGNOSIS — Z12.11 SPECIAL SCREENING FOR MALIGNANT NEOPLASMS, COLON: Primary | ICD-10-CM

## 2024-10-07 NOTE — TELEPHONE ENCOUNTER
Spoke to patient to schedule procedure(s) Colonoscopy       Physician to perform procedure(s) Dr. ELZA Resendiz  Date of Procedure (s) 12/19/24  Arrival Time 9:00 AM  Time of Procedure(s) 10:00 AM   Location of Procedure(s) [facility]  Type of Rx Prep sent to patient: PEG  Instructions provided to patient via Email:[email or web address]    Patient was informed on the following information and verbalized understanding. Screening questionnaire reviewed with patient and complete. If procedure requires anesthesia, a responsible adult needs to be present to accompany the patient home, patient cannot drive after receiving anesthesia. Appointment details are tentative, especially check-in time. Patient will receive a prep-op call 7 days prior to confirm check-in time for procedure. If applicable the patient should contact their pharmacy to verify Rx for procedure prep is ready for pick-up. Patient was advised to call the scheduling department at [phone] if pharmacy states no Rx is available. Patient was advised to call the endoscopy scheduling department if any questions or concerns arise.      [facility] Scheduling Department    The patient is currently under an external cardiologist Dr. Matt mallory and requires a blood thinner Eliquis (apixaban) for their upcoming scheduled Colonoscopy on 12/19/24.       External provider information:    Physician name: Matt Lama  Phone number: [phone]  Location:  [facility]

## 2024-10-07 NOTE — TELEPHONE ENCOUNTER
"[place] Jennifer B, RN         Previous Messages       ----- Message -----  From: Dami Resendiz MD  Sent: 10/4/2024   1:58 PM CDT  To: [facility] Patients    Procedure: Colonoscopy    Diagnosis: diverticulitis    Procedure Timin-8 weeks    *If within 4 weeks selected, please ghislaine as high priority*    *If greater than 12 weeks, please select "5-12 weeks" and delay sending until 3 months prior to requested date*    Location: Any Site    Additional Scheduling Information: Blood thinners    Prep Specifications:Standard prep    Is the patient taking a GLP-1 Agonist:no    Have you attached a patient to this message: yes  "

## 2024-11-19 ENCOUNTER — TELEPHONE (OUTPATIENT)
Dept: ENDOSCOPY | Facility: HOSPITAL | Age: 79
End: 2024-11-19
Payer: MEDICARE

## 2024-11-19 NOTE — TELEPHONE ENCOUNTER
----- Message from MORRO Montez sent at 10/7/2024  3:00 PM CDT -----  Regardin/19 BT  The patient is currently under an external cardiologist Dr. Matt Lama care and requires a blood thinner Eliquis (apixaban) for their upcoming scheduled Colonoscopy on 24.       External provider information:    Physician name: Matt Lama  Phone number: [phone]  Location:  [facility]

## 2024-11-19 NOTE — TELEPHONE ENCOUNTER
Department of Endoscopy      Dear Dr. Lama,    Your patient, Emiliano Small 1945 is being scheduled for Colonoscopy and our records indicate they are taking Eliquis (apixaban).    Please indicate if and when the patient can safely stop their medication prior to the procedure by completing one of the following:    Do not discontinue medication: _____    Medication can be discontinued _____ days prior to the procedure.    Please take into consideration that therapeutic maneuvers may be performed during the procedure that requires delay in restarting anticoagulation therapy.      Signature: ______________________________________________ Date:__________________    Please sign and date this letter and return fax to : [phone] If you have any questions or concerns, please call [phone] Monday-Friday, 8:00 am-3:00 pm.     Thank you for your prompt response,    [facility]       Medication                                                                Hold Time                                                                                                                                                        ANTIPLATELETS   Persantine (dipyridamole) 2 days   Aggrenox (ASA/dipyridamole) 2 days     Pletal (cilostazol) 2 days     Plavix (clopidogrel) 5 days     Effient (prasugrel) 5 days     Ticlid (ticlidopine) 10 days     Brilinta (ticagrelor) 3 days     Zontivity (vorapaxar) 5 days   ANTICOAGULANTS   Coumadin (warfarin) 5 days   Lovenox (enoxaparin)  -last dose administered to be 50% of total daily dose 24 hours   Fragmin (dalteparin)   -last dose administered to be 50% of total daily dose 24 hours   Arixtra (fondaparinux) 2 days     Xarelto (rivaroxaban)   2 days     Eliquis (apixaban)   2 days     Savaysa (edoxaban)   2 days     Pradaxa (dabigatran)   2 days     Iprivask (desirudin)   10 hrs

## 2024-12-05 ENCOUNTER — TELEPHONE (OUTPATIENT)
Dept: SURGERY | Facility: CLINIC | Age: 79
End: 2024-12-05
Payer: MEDICARE

## 2024-12-05 ENCOUNTER — TELEPHONE (OUTPATIENT)
Dept: ENDOSCOPY | Facility: HOSPITAL | Age: 79
End: 2024-12-05
Payer: MEDICARE

## 2024-12-05 NOTE — TELEPHONE ENCOUNTER
[place] Jennifer B RN  Caller: [phone] (Today, 10:24 AM)         Previous Messages       ----- Message -----  From: Terese Buck RN  Sent: 12/5/2024  10:29 AM CST  To: [facility] Endoscopy Schedulers  Subject: FW: appt access                                  Please assist. Thank you!  ----- Message -----  From: Marylu Gillespie  Sent: 12/5/2024  10:25 AM CST  To: [facility]  Subject: appt access                                      Pt calling to cancel procedure. Pls call

## 2024-12-05 NOTE — TELEPHONE ENCOUNTER
----- Message from Marylu sent at 12/5/2024 10:24 AM CST -----  Regarding: appt access  Contact: [phone]  Pt calling to cancel procedure. Pls call

## 2024-12-05 NOTE — TELEPHONE ENCOUNTER
Contacted the patient to cancel an endoscopy procedure(s) Colonoscopy. The patient did not answer the call and left a voice message requesting a call back.

## (undated) DEVICE — SEE MEDLINE ITEM 157117

## (undated) DEVICE — SOL NS 1000CC

## (undated) DEVICE — MARKER SKIN STND TIP BLUE BARR

## (undated) DEVICE — SUT 0 VICRYL / UR6 (J603)

## (undated) DEVICE — ADHESIVE MASTISOL VIAL 48/BX

## (undated) DEVICE — SEE MEDLINE ITEM 157148

## (undated) DEVICE — TRAY FOLEY 16FR INFECTION CONT

## (undated) DEVICE — NDL HYPO REG 25G X 1 1/2

## (undated) DEVICE — TRAY MINOR GEN SURG

## (undated) DEVICE — BANDAGE ADHESIVE

## (undated) DEVICE — SUT GUT PL. 4-0 27 FS-2

## (undated) DEVICE — GOWN SURGICAL X-LARGE

## (undated) DEVICE — SYR 30CC LUER LOCK

## (undated) DEVICE — BLADE SURG CARBON STEEL SZ11

## (undated) DEVICE — APPLICATOR CHLORAPREP ORN 26ML

## (undated) DEVICE — DRESSING LEUKOPLAST FLEX 1X3IN

## (undated) DEVICE — TROCAR ENDOPATH XCEL 5MM 7.5CM

## (undated) DEVICE — Device

## (undated) DEVICE — CLOSURE SKIN STERI STRIP 1/4X3

## (undated) DEVICE — TUBING HF INSUFFLATION W/ FLTR

## (undated) DEVICE — ELECTRODE REM PLYHSV RETURN 9

## (undated) DEVICE — DISSECTOR SPACEMAKER + 10-12MM